# Patient Record
Sex: FEMALE | Race: WHITE | Employment: OTHER | URBAN - METROPOLITAN AREA
[De-identification: names, ages, dates, MRNs, and addresses within clinical notes are randomized per-mention and may not be internally consistent; named-entity substitution may affect disease eponyms.]

---

## 2018-06-20 PROBLEM — T50.905A ALLERGIC REACTION DUE TO CORRECT MEDICINAL SUBSTANCE PROPERLY ADMINISTERED: Status: ACTIVE | Noted: 2018-06-20

## 2018-12-04 LAB
A/G RATIO: 1.8 (ref 1.1–2.2)
ALBUMIN SERPL-MCNC: 4.4 G/DL (ref 3.4–5)
ALP BLD-CCNC: 146 U/L (ref 40–129)
ALT SERPL-CCNC: 13 U/L (ref 10–40)
ANION GAP SERPL CALCULATED.3IONS-SCNC: 15 MMOL/L (ref 3–16)
AST SERPL-CCNC: 24 U/L (ref 15–37)
BASOPHILS ABSOLUTE: 0.1 K/UL (ref 0–0.2)
BASOPHILS RELATIVE PERCENT: 0.9 %
BILIRUB SERPL-MCNC: 0.4 MG/DL (ref 0–1)
BUN BLDV-MCNC: 16 MG/DL (ref 7–20)
CALCIUM SERPL-MCNC: 9.8 MG/DL (ref 8.3–10.6)
CHLORIDE BLD-SCNC: 100 MMOL/L (ref 99–110)
CO2: 23 MMOL/L (ref 21–32)
CREAT SERPL-MCNC: 0.7 MG/DL (ref 0.6–1.2)
EOSINOPHILS ABSOLUTE: 0.2 K/UL (ref 0–0.6)
EOSINOPHILS RELATIVE PERCENT: 3.1 %
GFR AFRICAN AMERICAN: >60
GFR NON-AFRICAN AMERICAN: >60
GLOBULIN: 2.4 G/DL
GLUCOSE BLD-MCNC: 91 MG/DL (ref 70–99)
HCT VFR BLD CALC: 35.5 % (ref 36–48)
HEMOGLOBIN: 11.2 G/DL (ref 12–16)
LIPASE: 32 U/L (ref 13–60)
LYMPHOCYTES ABSOLUTE: 1.6 K/UL (ref 1–5.1)
LYMPHOCYTES RELATIVE PERCENT: 21.6 %
MCH RBC QN AUTO: 26.7 PG (ref 26–34)
MCHC RBC AUTO-ENTMCNC: 31.5 G/DL (ref 31–36)
MCV RBC AUTO: 84.8 FL (ref 80–100)
MONOCYTES ABSOLUTE: 1 K/UL (ref 0–1.3)
MONOCYTES RELATIVE PERCENT: 13.3 %
NEUTROPHILS ABSOLUTE: 4.5 K/UL (ref 1.7–7.7)
NEUTROPHILS RELATIVE PERCENT: 61.1 %
PDW BLD-RTO: 15.6 % (ref 12.4–15.4)
PLATELET # BLD: 345 K/UL (ref 135–450)
PMV BLD AUTO: 8.8 FL (ref 5–10.5)
POTASSIUM SERPL-SCNC: 5 MMOL/L (ref 3.5–5.1)
RBC # BLD: 4.19 M/UL (ref 4–5.2)
SODIUM BLD-SCNC: 138 MMOL/L (ref 136–145)
TOTAL PROTEIN: 6.8 G/DL (ref 6.4–8.2)
WBC # BLD: 7.4 K/UL (ref 4–11)

## 2018-12-21 ENCOUNTER — HOSPITAL ENCOUNTER (OUTPATIENT)
Age: 68
Discharge: HOME | End: 2018-12-21
Payer: MEDICARE

## 2018-12-21 ENCOUNTER — HOSPITAL ENCOUNTER (OUTPATIENT)
Dept: GENERAL RADIOLOGY | Age: 68
Discharge: HOME | End: 2018-12-21
Payer: MEDICARE

## 2018-12-21 DIAGNOSIS — M81.8 OTHER OSTEOPOROSIS, UNSPECIFIED PATHOLOGICAL FRACTURE PRESENCE: ICD-10-CM

## 2018-12-21 DIAGNOSIS — M54.40 ACUTE RIGHT-SIDED LOW BACK PAIN WITH SCIATICA, SCIATICA LATERALITY UNSPECIFIED: ICD-10-CM

## 2018-12-21 PROCEDURE — 72100 X-RAY EXAM L-S SPINE 2/3 VWS: CPT

## 2020-07-22 ENCOUNTER — OFFICE VISIT (OUTPATIENT)
Dept: UROGYNECOLOGY | Age: 70
End: 2020-07-22
Payer: MEDICARE

## 2020-07-22 VITALS
SYSTOLIC BLOOD PRESSURE: 128 MMHG | RESPIRATION RATE: 16 BRPM | BODY MASS INDEX: 22.45 KG/M2 | DIASTOLIC BLOOD PRESSURE: 60 MMHG | WEIGHT: 122 LBS | HEART RATE: 88 BPM | HEIGHT: 62 IN

## 2020-07-22 LAB
BILIRUBIN, POC: NEGATIVE
BLOOD URINE, POC: NORMAL
CLARITY, POC: CLEAR
COLOR, POC: YELLOW
GLUCOSE URINE, POC: NEGATIVE
KETONES, POC: NEGATIVE
LEUKOCYTE EST, POC: NORMAL
NITRITE, POC: POSITIVE
PH, POC: 7
PROTEIN, POC: NEGATIVE
SPECIFIC GRAVITY, POC: 1
UROBILINOGEN, POC: NEGATIVE

## 2020-07-22 PROCEDURE — 99214 OFFICE O/P EST MOD 30 MIN: CPT | Performed by: OBSTETRICS & GYNECOLOGY

## 2020-07-22 PROCEDURE — 51702 INSERT TEMP BLADDER CATH: CPT | Performed by: OBSTETRICS & GYNECOLOGY

## 2020-07-22 PROCEDURE — 81002 URINALYSIS NONAUTO W/O SCOPE: CPT | Performed by: OBSTETRICS & GYNECOLOGY

## 2020-07-22 RX ORDER — CIPROFLOXACIN 500 MG/1
500 TABLET, FILM COATED ORAL 2 TIMES DAILY
Qty: 14 TABLET | Refills: 0 | Status: SHIPPED | OUTPATIENT
Start: 2020-07-22 | End: 2020-08-01

## 2020-07-22 NOTE — PROGRESS NOTES
7/22/2020     Name: Ozzy Franklin  YOB: 1950    CC: Patient is a 69 y.o. presenting for evaluation of possible UTI symptoms. HPI: How long have you had this problem? years  Please rate the severity of your problem: severe  Anything make it better? Medications  She has been doing self start macrodantin and estrogen cream and cranberry    Ob/Gyn History:    OB History   No obstetric history on file. Past Medical History:   Past Medical History:   Diagnosis Date    Anemia     Arthritis     osteoporesis    Blood in stool     Chronic pancreatitis (HCC)     GERD (gastroesophageal reflux disease)     Hypertension     Migraines     Osteopetrosis     Raynaud disease     Rheumatoid arthritis(714.0)     rheumatoid arthritis    Vertigo      Past Surgical History:   Past Surgical History:   Procedure Laterality Date    BONE MARROW BIOPSY      CHOLECYSTECTOMY      COLONOSCOPY      ENDOSCOPY, COLON, DIAGNOSTIC      ENDOSCOPY, COLON, DIAGNOSTIC  07/25/2014    EGD    FRACTURE SURGERY Left 1998    femur and tibia broke in [address] PARATHYROIDECTOMY Bilateral 2006    one side taken out due to infection    TONSILLECTOMY       Allergies:    Allergies   Allergen Reactions    Sulfa Antibiotics     Demerol Hcl [Meperidine] Nausea And Vomiting    Erythromycin Diarrhea and Nausea And Vomiting     Current Medications:  Current Outpatient Medications   Medication Sig Dispense Refill    ciprofloxacin (CIPRO) 500 MG tablet Take 1 tablet by mouth 2 times daily for 10 days 14 tablet 0    RESTASIS 0.05 % ophthalmic emulsion Place 1 drop into both eyes 2 times daily  3    PREMARIN 0.625 MG/GM vaginal cream Place 0.5 g vaginally Twice a Week  2    omeprazole (PRILOSEC) 40 MG delayed release capsule Take 40 mg by mouth every morning (before breakfast)  3    naproxen sodium (ALEVE) 220 MG tablet Take 220 mg by mouth 2 times daily as needed for Pain      amylase-lipase-protease (CREON) 00234 UNITS per capsule Take 1 capsule by mouth 3 times daily (with meals).  amLODIPine (NORVASC) 10 MG tablet Take 10 mg by mouth nightly       lisinopril (PRINIVIL;ZESTRIL) 10 MG tablet Take 10 mg by mouth nightly       acetaminophen (TYLENOL) 500 MG tablet Take 1,000 mg by mouth every 6 hours as needed for Pain.  Multiple Vitamins-Minerals (MULTIVITAMIN PO) Take 1 tablet by mouth daily.  ascorbic acid (VITAMIN C) 500 MG tablet Take 500 mg by mouth daily.  Cholecalciferol (VITAMIN D3) 3000 UNITS TABS Take 1 tablet by mouth daily.  beclomethasone (QVAR) 80 MCG/ACT inhaler Inhale 1 puff into the lungs 2 times daily       No current facility-administered medications for this visit.       Social History:   Social History     Socioeconomic History    Marital status:      Spouse name: Not on file    Number of children: Not on file    Years of education: Not on file    Highest education level: Not on file   Occupational History    Not on file   Social Needs    Financial resource strain: Not on file    Food insecurity     Worry: Not on file     Inability: Not on file    Transportation needs     Medical: Not on file     Non-medical: Not on file   Tobacco Use    Smoking status: Never Smoker    Smokeless tobacco: Never Used   Substance and Sexual Activity    Alcohol use: No     Comment: rare    Drug use: No    Sexual activity: Yes     Partners: Male   Lifestyle    Physical activity     Days per week: Not on file     Minutes per session: Not on file    Stress: Not on file   Relationships    Social connections     Talks on phone: Not on file     Gets together: Not on file     Attends Christianity service: Not on file     Active member of club or organization: Not on file     Attends meetings of clubs or organizations: Not on file     Relationship status: Not on file    Intimate partner violence     Fear of current or ex partner: Not on file Emotionally abused: Not on file     Physically abused: Not on file     Forced sexual activity: Not on file   Other Topics Concern    Not on file   Social History Narrative    Not on file     Family History:   Family History   Problem Relation Age of Onset    Stroke Mother     Lung Cancer Father     Cancer Brother         Testicular    Diabetes Brother     Elevated Lipids Brother     Hypertension Brother      Review of System   Review of Systems   Genitourinary: Positive for dysuria and pelvic pain. All other systems reviewed and are negative. Vitals  Vitals:    07/22/20 1140   BP: 128/60   Site: Left Upper Arm   Position: Sitting   Cuff Size: Medium Adult   Pulse: 88   Resp: 16   Weight: 122 lb (55.3 kg)   Height: 5' 1.5\" (1.562 m)     Physical Exam  Physical Exam  Exam conducted with a chaperone present. HENT:      Head: Normocephalic and atraumatic. Pulmonary:      Effort: Pulmonary effort is normal.   Abdominal:      General: Abdomen is flat. Palpations: Abdomen is soft. Genitourinary:     Exam position: Lithotomy position. Skin:     General: Skin is warm and dry. Neurological:      Mental Status: She is alert and oriented to person, place, and time. Psychiatric:         Mood and Affect: Mood normal.         Results for POC orders placed in visit on 07/22/20   POCT Urinalysis no Micro   Result Value Ref Range    Color, UA Yellow     Clarity, UA clear     Glucose, UA POC negative     Bilirubin, UA negative     Ketones, UA negative     Spec Grav, UA 1.005     Blood, UA POC trace     pH, UA 7     Protein, UA POC negative     Urobilinogen, UA negative     Leukocytes, UA large     Nitrite, UA positive        Assessment/Plan:  Michelle Lang is a 69 y.o. female with   1. History of UTI    2. Urinary urgency    3. Urinary frequency    4. Acute cystitis with hematuria      She tried her macrobid but still has a UTI, UA was positive today.   Called in cipro 500 BID and then we will wait for the culture to see what to do for the future. Orders Placed This Encounter   Procedures    Culture, Urine     Order Specific Question:   Specify (ex-cath, midstream, cysto, etc)?      Answer:   straight cath    POCT Urinalysis no Micro    INSERT,TEMP INDWELLING BLAD CATH,SIMPLE     Orders Placed This Encounter   Medications    ciprofloxacin (CIPRO) 500 MG tablet     Sig: Take 1 tablet by mouth 2 times daily for 10 days     Dispense:  14 tablet     Refill:  0       Arian Channel

## 2020-07-22 NOTE — LETTER
[address]  Urogynecology  [address] [facility]  Phone: [phone]  Fax: [phone]    Elizabeth Marley MD        July 22, 2020     No referring provider defined for this encounter. Patient: Louise Rayo   MR Number: [id number]   YOB: 1950   Date of Visit: 7/22/2020       Dear Dr. Elan Martinez ref. provider found: Thank you for referring Louise Rayo to me for evaluation. Below are the relevant portions of my assessment and plan of care. If you have questions, please do not hesitate to call me. I look forward to following Lia Gan along with you.     Sincerely,        Elizabeth Marley MD

## 2020-07-24 ENCOUNTER — TELEPHONE (OUTPATIENT)
Dept: UROGYNECOLOGY | Age: 70
End: 2020-07-24

## 2020-07-25 LAB
ORGANISM: ABNORMAL
URINE CULTURE, ROUTINE: ABNORMAL

## 2020-07-27 ENCOUNTER — TELEPHONE (OUTPATIENT)
Dept: UROGYNECOLOGY | Age: 70
End: 2020-07-27

## 2020-07-27 NOTE — TELEPHONE ENCOUNTER
Spoke with Dr. Pincus Dakins, Notified the patient Cipro was the appropriate antibiotic for her current infection. Instructed to complete the antibiotic, if she starts to have symptoms again she can start the macrodantin if that does not help to come in for another culture.

## 2021-02-09 ENCOUNTER — HOSPITAL ENCOUNTER (OUTPATIENT)
Dept: CT IMAGING | Age: 71
Discharge: HOME | End: 2021-02-09
Payer: MEDICARE

## 2021-02-09 DIAGNOSIS — K92.1 HEMATOCHEZIA: ICD-10-CM

## 2021-02-09 LAB
GFR AFRICAN AMERICAN: >60
GFR NON-AFRICAN AMERICAN: >60
PERFORMED ON: NORMAL
POC CREATININE: 0.8 MG/DL (ref 0.6–1.2)
POC SAMPLE TYPE: NORMAL

## 2021-02-09 PROCEDURE — 74178 CT ABD&PLV WO CNTR FLWD CNTR: CPT

## 2021-02-09 PROCEDURE — 82565 ASSAY OF CREATININE: CPT

## 2021-02-09 PROCEDURE — 6360000004 HC RX CONTRAST MEDICATION: Performed by: INTERNAL MEDICINE

## 2021-02-09 RX ADMIN — IOPAMIDOL 75 ML: 755 INJECTION, SOLUTION INTRAVENOUS at 09:51

## 2021-02-09 RX ADMIN — IOHEXOL 50 ML: 240 INJECTION, SOLUTION INTRATHECAL; INTRAVASCULAR; INTRAVENOUS; ORAL at 09:51

## 2021-04-30 ENCOUNTER — TELEPHONE (OUTPATIENT)
Dept: UROGYNECOLOGY | Age: 71
End: 2021-04-30

## 2021-07-01 RX ORDER — CONJUGATED ESTROGENS 0.62 MG/G
0.5 CREAM VAGINAL
Qty: 1 TUBE | Refills: 2 | Status: SHIPPED | OUTPATIENT
Start: 2021-07-01

## 2021-07-01 NOTE — PROGRESS NOTES
Patient Requested a refill for her premarin Cream, ok to refill per Katherine Mercado NP. Refill sent to pharmacy of request, patient has an appointment in a few weeks for follow up.

## 2021-07-22 ENCOUNTER — OFFICE VISIT (OUTPATIENT)
Dept: UROGYNECOLOGY | Age: 71
End: 2021-07-22
Payer: MEDICARE

## 2021-07-22 VITALS
TEMPERATURE: 98 F | SYSTOLIC BLOOD PRESSURE: 152 MMHG | HEART RATE: 78 BPM | RESPIRATION RATE: 14 BRPM | DIASTOLIC BLOOD PRESSURE: 89 MMHG | OXYGEN SATURATION: 98 %

## 2021-07-22 DIAGNOSIS — Z87.440 HISTORY OF UTI: Primary | ICD-10-CM

## 2021-07-22 DIAGNOSIS — N95.2 VAGINAL ATROPHY: ICD-10-CM

## 2021-07-22 DIAGNOSIS — R35.0 URINARY FREQUENCY: ICD-10-CM

## 2021-07-22 DIAGNOSIS — R39.15 URINARY URGENCY: ICD-10-CM

## 2021-07-22 PROBLEM — J30.9 ALLERGIC RHINITIS: Status: ACTIVE | Noted: 2020-02-12

## 2021-07-22 PROBLEM — Z79.01 CHRONIC ANTICOAGULATION: Status: ACTIVE | Noted: 2021-01-29

## 2021-07-22 PROBLEM — J96.01 ACUTE RESPIRATORY FAILURE WITH HYPOXIA (HCC): Status: ACTIVE | Noted: 2020-12-21

## 2021-07-22 PROBLEM — M41.9 KYPHOSCOLIOSIS: Status: ACTIVE | Noted: 2018-05-14

## 2021-07-22 PROBLEM — I48.0 PAF (PAROXYSMAL ATRIAL FIBRILLATION) (HCC): Status: ACTIVE | Noted: 2021-03-08

## 2021-07-22 PROBLEM — J84.9 INTERSTITIAL LUNG DISEASE (HCC): Status: ACTIVE | Noted: 2020-12-21

## 2021-07-22 PROBLEM — I65.21 INTERNAL CAROTID ARTERY STENOSIS, RIGHT: Status: ACTIVE | Noted: 2021-02-17

## 2021-07-22 PROBLEM — D63.8 ANEMIA OF CHRONIC DISEASE: Status: ACTIVE | Noted: 2018-05-14

## 2021-07-22 PROBLEM — E21.3 HYPERPARATHYROIDISM, UNSPECIFIED (HCC): Status: ACTIVE | Noted: 2021-02-17

## 2021-07-22 PROBLEM — I73.00 RAYNAUD'S PHENOMENON: Status: ACTIVE | Noted: 2021-07-22

## 2021-07-22 PROCEDURE — 1090F PRES/ABSN URINE INCON ASSESS: CPT | Performed by: OBSTETRICS & GYNECOLOGY

## 2021-07-22 PROCEDURE — G8427 DOCREV CUR MEDS BY ELIG CLIN: HCPCS | Performed by: OBSTETRICS & GYNECOLOGY

## 2021-07-22 PROCEDURE — 3017F COLORECTAL CA SCREEN DOC REV: CPT | Performed by: OBSTETRICS & GYNECOLOGY

## 2021-07-22 PROCEDURE — 99214 OFFICE O/P EST MOD 30 MIN: CPT | Performed by: OBSTETRICS & GYNECOLOGY

## 2021-07-22 PROCEDURE — G8420 CALC BMI NORM PARAMETERS: HCPCS | Performed by: OBSTETRICS & GYNECOLOGY

## 2021-07-22 PROCEDURE — 1036F TOBACCO NON-USER: CPT | Performed by: OBSTETRICS & GYNECOLOGY

## 2021-07-22 PROCEDURE — 4040F PNEUMOC VAC/ADMIN/RCVD: CPT | Performed by: OBSTETRICS & GYNECOLOGY

## 2021-07-22 PROCEDURE — G8400 PT W/DXA NO RESULTS DOC: HCPCS | Performed by: OBSTETRICS & GYNECOLOGY

## 2021-07-22 PROCEDURE — 1123F ACP DISCUSS/DSCN MKR DOCD: CPT | Performed by: OBSTETRICS & GYNECOLOGY

## 2021-07-22 RX ORDER — NITROFURANTOIN 25; 75 MG/1; MG/1
100 CAPSULE ORAL 2 TIMES DAILY
Qty: 20 CAPSULE | Refills: 1 | Status: SHIPPED | OUTPATIENT
Start: 2021-07-22 | End: 2021-12-15

## 2021-07-22 RX ORDER — DILTIAZEM HYDROCHLORIDE 180 MG/1
180 CAPSULE, EXTENDED RELEASE ORAL DAILY
COMMUNITY

## 2021-07-22 ASSESSMENT — ENCOUNTER SYMPTOMS: SHORTNESS OF BREATH: 1

## 2021-07-22 NOTE — PROGRESS NOTES
7/22/2021       HPI:     Name: Amada Zayas  YOB: 1950    CC: Patient is a 66 y.o. presenting for evaluation of recurrent UTI.   HPI: How long have you had this problem? Several years  Please rate the severity of your problem: mild  Anything make it better? Cranberry and estrogen cream she is still UTI free. Ob/Gyn History:    OB History   No obstetric history on file. Past Medical History:   Past Medical History:   Diagnosis Date    Anemia     Arthritis     osteoporesis    Blood in stool     Chronic pancreatitis (HCC)     GERD (gastroesophageal reflux disease)     Hypertension     Migraines     Osteopetrosis     Raynaud disease     Rheumatoid arthritis(714.0)     rheumatoid arthritis    Vertigo      Past Surgical History:   Past Surgical History:   Procedure Laterality Date    BONE MARROW BIOPSY      CHOLECYSTECTOMY      COLONOSCOPY      ENDOSCOPY, COLON, DIAGNOSTIC      ENDOSCOPY, COLON, DIAGNOSTIC  07/25/2014    EGD    FRACTURE SURGERY Left 1998    femur and tibia broke in [address] PARATHYROIDECTOMY Bilateral 2006    one side taken out due to infection    TONSILLECTOMY       Allergies:    Allergies   Allergen Reactions    Sulfa Antibiotics     Demerol Hcl [Meperidine] Nausea And Vomiting    Erythromycin Diarrhea and Nausea And Vomiting     Current Medications:  Current Outpatient Medications   Medication Sig Dispense Refill    dilTIAZem (DILACOR XR) 180 MG extended release capsule Take 180 mg by mouth daily      apixaban (ELIQUIS) 5 MG TABS tablet Take 5 mg by mouth 2 times daily      nitrofurantoin, macrocrystal-monohydrate, (MACROBID) 100 MG capsule Take 1 capsule by mouth 2 times daily 20 capsule 1    PREMARIN 0.625 MG/GM vaginal cream Place 0.5 g vaginally Twice a Week 1 Tube 2    RESTASIS 0.05 % ophthalmic emulsion Place 1 drop into both eyes 2 times daily  3    omeprazole (PRILOSEC) 40 MG delayed release capsule Take 40 mg by mouth every morning (before breakfast)  3    naproxen sodium (ALEVE) 220 MG tablet Take 220 mg by mouth 2 times daily as needed for Pain      beclomethasone (QVAR) 80 MCG/ACT inhaler Inhale 1 puff into the lungs 2 times daily      amylase-lipase-protease (CREON) 06861 UNITS per capsule Take 1 capsule by mouth 3 times daily (with meals).  lisinopril (PRINIVIL;ZESTRIL) 10 MG tablet Take 10 mg by mouth nightly       Multiple Vitamins-Minerals (MULTIVITAMIN PO) Take 1 tablet by mouth daily.  ascorbic acid (VITAMIN C) 500 MG tablet Take 500 mg by mouth daily.  Cholecalciferol (VITAMIN D3) 3000 UNITS TABS Take 1 tablet by mouth daily.  acetaminophen (TYLENOL) 500 MG tablet Take 1,000 mg by mouth every 6 hours as needed for Pain. (Patient not taking: Reported on 7/22/2021)       No current facility-administered medications for this visit. Social History:   Social History     Socioeconomic History    Marital status:      Spouse name: Not on file    Number of children: Not on file    Years of education: Not on file    Highest education level: Not on file   Occupational History    Not on file   Tobacco Use    Smoking status: Never Smoker    Smokeless tobacco: Never Used   Vaping Use    Vaping Use: Never used   Substance and Sexual Activity    Alcohol use: No     Comment: rare    Drug use: No    Sexual activity: Yes     Partners: Male   Other Topics Concern    Not on file   Social History Narrative    Not on file     Social Determinants of Health     Financial Resource Strain:     Difficulty of Paying Living Expenses:    Food Insecurity:     Worried About Running Out of Food in the Last Year:     [address] in the Last Year:    Transportation Needs:     Lack of Transportation (Medical):      Lack of Transportation (Non-Medical):    Physical Activity:     Days of Exercise per Week:     Minutes of Exercise per Session:    Stress:     Feeling of Stress : nitrofurantoin, macrocrystal-monohydrate, (MACROBID) 100 MG capsule     Sig: Take 1 capsule by mouth 2 times daily     Dispense:  20 capsule     Refill:  1       Cesia Cooley MD

## 2021-11-15 ENCOUNTER — TELEPHONE (OUTPATIENT)
Dept: ENDOCRINOLOGY | Age: 71
End: 2021-11-15

## 2021-11-15 NOTE — TELEPHONE ENCOUNTER
New patient referral. Please send a Health History Form to return to my attention and let them know I will look for appt day and time after receiving it. Thanks.     Referred by Dr. Danii Lawton

## 2021-11-16 ENCOUNTER — TELEPHONE (OUTPATIENT)
Dept: ENDOCRINOLOGY | Age: 71
End: 2021-11-16

## 2021-12-06 ENCOUNTER — TELEPHONE (OUTPATIENT)
Dept: ENDOCRINOLOGY | Age: 71
End: 2021-12-06

## 2021-12-06 RX ORDER — FLUTICASONE PROPIONATE 50 MCG
1 SPRAY, SUSPENSION (ML) NASAL DAILY
COMMUNITY

## 2021-12-06 RX ORDER — METHOTREXATE 2.5 MG/1
TABLET ORAL
COMMUNITY
Start: 2021-11-15

## 2021-12-06 RX ORDER — MONTELUKAST SODIUM 10 MG/1
TABLET ORAL
COMMUNITY
Start: 2021-10-03

## 2021-12-06 RX ORDER — FOLIC ACID 1 MG/1
TABLET ORAL
COMMUNITY
Start: 2021-09-20

## 2021-12-06 NOTE — TELEPHONE ENCOUNTER
New patient, [facility] rec'd. Please schedule and let me know when appt is. Remind them to bring all vitamins and supplements. DXA needed?   No

## 2021-12-13 ENCOUNTER — HOSPITAL ENCOUNTER (EMERGENCY)
Age: 71
Discharge: HOME | End: 2021-12-13
Attending: STUDENT IN AN ORGANIZED HEALTH CARE EDUCATION/TRAINING PROGRAM
Payer: MEDICARE

## 2021-12-13 ENCOUNTER — APPOINTMENT (OUTPATIENT)
Dept: CT IMAGING | Age: 71
End: 2021-12-13
Payer: MEDICARE

## 2021-12-13 VITALS
SYSTOLIC BLOOD PRESSURE: 158 MMHG | TEMPERATURE: 98.1 F | OXYGEN SATURATION: 98 % | DIASTOLIC BLOOD PRESSURE: 81 MMHG | RESPIRATION RATE: 18 BRPM | WEIGHT: 120 LBS | HEART RATE: 90 BPM | BODY MASS INDEX: 22.66 KG/M2 | HEIGHT: 61 IN

## 2021-12-13 DIAGNOSIS — G89.29 ACUTE EXACERBATION OF CHRONIC LOW BACK PAIN: Primary | ICD-10-CM

## 2021-12-13 DIAGNOSIS — M54.50 ACUTE EXACERBATION OF CHRONIC LOW BACK PAIN: Primary | ICD-10-CM

## 2021-12-13 DIAGNOSIS — S39.012A LUMBOSACRAL STRAIN, INITIAL ENCOUNTER: ICD-10-CM

## 2021-12-13 PROCEDURE — 99283 EMERGENCY DEPT VISIT LOW MDM: CPT

## 2021-12-13 PROCEDURE — 96372 THER/PROPH/DIAG INJ SC/IM: CPT

## 2021-12-13 PROCEDURE — 72131 CT LUMBAR SPINE W/O DYE: CPT

## 2021-12-13 PROCEDURE — 6360000002 HC RX W HCPCS: Performed by: STUDENT IN AN ORGANIZED HEALTH CARE EDUCATION/TRAINING PROGRAM

## 2021-12-13 PROCEDURE — 6370000000 HC RX 637 (ALT 250 FOR IP): Performed by: STUDENT IN AN ORGANIZED HEALTH CARE EDUCATION/TRAINING PROGRAM

## 2021-12-13 RX ORDER — LIDOCAINE 4 G/G
1 PATCH TOPICAL DAILY
Status: DISCONTINUED | OUTPATIENT
Start: 2021-12-13 | End: 2021-12-13 | Stop reason: HOSPADM

## 2021-12-13 RX ORDER — METHOCARBAMOL 500 MG/1
500 TABLET, FILM COATED ORAL 4 TIMES DAILY
Qty: 40 TABLET | Refills: 0 | Status: SHIPPED | OUTPATIENT
Start: 2021-12-13 | End: 2021-12-23

## 2021-12-13 RX ORDER — PREGABALIN 75 MG/1
75 CAPSULE ORAL 2 TIMES DAILY
Qty: 60 CAPSULE | Refills: 0 | Status: SHIPPED | OUTPATIENT
Start: 2021-12-13 | End: 2022-02-09

## 2021-12-13 RX ORDER — ORPHENADRINE CITRATE 30 MG/ML
60 INJECTION INTRAMUSCULAR; INTRAVENOUS ONCE
Status: COMPLETED | OUTPATIENT
Start: 2021-12-13 | End: 2021-12-13

## 2021-12-13 RX ORDER — KETOROLAC TROMETHAMINE 30 MG/ML
15 INJECTION, SOLUTION INTRAMUSCULAR; INTRAVENOUS ONCE
Status: COMPLETED | OUTPATIENT
Start: 2021-12-13 | End: 2021-12-13

## 2021-12-13 RX ADMIN — ORPHENADRINE CITRATE 60 MG: 30 INJECTION INTRAMUSCULAR; INTRAVENOUS at 14:07

## 2021-12-13 RX ADMIN — KETOROLAC TROMETHAMINE 15 MG: 30 INJECTION, SOLUTION INTRAMUSCULAR at 14:06

## 2021-12-13 ASSESSMENT — PAIN DESCRIPTION - LOCATION: LOCATION: BACK

## 2021-12-13 ASSESSMENT — PAIN SCALES - GENERAL
PAINLEVEL_OUTOF10: 8
PAINLEVEL_OUTOF10: 10

## 2021-12-13 ASSESSMENT — PAIN DESCRIPTION - DESCRIPTORS: DESCRIPTORS: ACHING;SHARP

## 2021-12-13 ASSESSMENT — PAIN DESCRIPTION - PAIN TYPE: TYPE: CHRONIC PAIN;ACUTE PAIN

## 2021-12-14 SDOH — HEALTH STABILITY: PHYSICAL HEALTH: ON AVERAGE, HOW MANY MINUTES DO YOU ENGAGE IN EXERCISE AT THIS LEVEL?: 0 MIN

## 2021-12-14 SDOH — HEALTH STABILITY: PHYSICAL HEALTH: ON AVERAGE, HOW MANY DAYS PER WEEK DO YOU ENGAGE IN MODERATE TO STRENUOUS EXERCISE (LIKE A BRISK WALK)?: 0 DAYS

## 2021-12-14 NOTE — ED PROVIDER NOTES
[address] [facility]      Pt Name: Michlele Rodríguez  MRN: [id number]  [place] 1950  Date of evaluation: 12/13/2021  Provider: Luz Marina Lynn MD    CHIEF COMPLAINT       Chief Complaint   Patient presents with    Back Pain     patient with hx of compression fractures. patient taking flexeril and aleve without relief. patient with hx scoliosis. denies any urinating problems. is able to ambulate but with pain. denies any recent injuries. Back pain. HISTORY OF PRESENT ILLNESS   (Location/Symptom, Timing/Onset,Context/Setting, Quality, Duration, Modifying Factors, Severity)  Note limiting factors. Michelle Rodríguez is a 66 y.o. female who presents to the emergency department with back pain x 3-4 days after helping lift some boxes in the garage. Has significant h/o scoliosis and compression fractures. Denies trauma, falls, but states she slipped and twisted her back feeling a pop and is concerned for a new compression fracture. Pain localized to the mid lumbar back radiating down the right leg. Pain so severe she is having difficulty walking. Denies urinary retention, focal weakness, sensory loss, fevers. Symptoms not otherwise alleviated or exacerbated by other factors. NursingNotes were reviewed. REVIEW OF SYSTEMS    (2-9 systems for level 4, 10 or more for level 5)       Constitutional: No fever or chills. Eye: No visual disturbances. No eye pain. Ear/Nose/Mouth/Throat: No nasal congestion. No sore throat. Respiratory: No cough, No shortness of breath, No sputum production. Cardiovascular: No chest pain. No palpitations. Gastrointestinal: No abdominal pain. No nausea or vomiting  Genitourinary: No dysuria. No hematuria. Hematology/Lymphatics: No bleeding or bruising tendency. Immunologic: No malaise. No swollen glands. Musculoskeletal: + back pain. No joint pain. Integumentary: No rash. No abrasions.   Neurologic: No headache. No focal numbness or weakness.       PAST MEDICAL HISTORY     Past Medical History:   Diagnosis Date    Anemia     Arthritis     osteoporesis    Blood in stool     Chronic pancreatitis ([facility] [address]     GERD (gastroesophageal reflux disease)     Hypertension     Migraines     Osteopetrosis     Postmenopausal     Raynaud disease     Rheumatoid arthritis(714.0)     rheumatoid arthritis    Rib fracture 1975    2 ribs fracture - coughing    Vertigo          SURGICALHISTORY       Past Surgical History:   Procedure Laterality Date    BONE MARROW BIOPSY      CHOLECYSTECTOMY      COLONOSCOPY      ENDOSCOPY, COLON, DIAGNOSTIC      ENDOSCOPY, COLON, DIAGNOSTIC  07/25/2014    EGD    FRACTURE SURGERY Left 1998    femur and tibia broke in [address] PARATHYROIDECTOMY Bilateral 2006    one side taken out due to infection    TONSILLECTOMY           CURRENT MEDICATIONS       Discharge Medication List as of 12/13/2021  3:46 PM      CONTINUE these medications which have NOT CHANGED    Details   montelukast (SINGULAIR) 10 MG tablet Historical Med      methotrexate (RHEUMATREX) 2.5 MG chemo tablet Historical Med      folic acid (FOLVITE) 1 MG tablet Historical Med      fluticasone (FLONASE) 50 MCG/ACT nasal spray 1 spray by Nasal route dailyHistorical Med      dilTIAZem (DILACOR XR) 180 MG extended release capsule Take 180 mg by mouth dailyHistorical Med      apixaban (ELIQUIS) 5 MG TABS tablet Take 5 mg by mouth 2 times dailyHistorical Med      nitrofurantoin, macrocrystal-monohydrate, (MACROBID) 100 MG capsule Take 1 capsule by mouth 2 times daily, Disp-20 capsule, R-1Normal      PREMARIN 0.625 MG/GM vaginal cream Place 0.5 g vaginally Twice a Week, Vaginal, TWICE WEEKLY Starting Thu 7/1/2021, Disp-1 Tube, R-2, ANUSHKA, Normal      RESTASIS 0.05 % ophthalmic emulsion Place 1 drop into both eyes 2 times daily, R-3, DAWHistorical Med      omeprazole (PRILOSEC) 40 MG delayed release capsule Take 40 mg by mouth every morning (before breakfast), R-3Historical Med      naproxen sodium (ALEVE) 220 MG tablet Take 220 mg by mouth 2 times daily as needed for PainHistorical Med      beclomethasone (QVAR) 80 MCG/ACT inhaler Inhale 1 puff into the lungs 2 times daily      amylase-lipase-protease (CREON) 75647 UNITS per capsule Take 1 capsule by mouth 3 times daily (with meals). , Oral, 3 TIMES DAILY WITH MEALS, Until Discontinued, Historical Med      lisinopril (PRINIVIL;ZESTRIL) 10 MG tablet Take 10 mg by mouth nightly Historical Med      acetaminophen (TYLENOL) 500 MG tablet Take 1,000 mg by mouth every 6 hours as needed for Pain. Multiple Vitamins-Minerals (MULTIVITAMIN PO) Take 1 tablet by mouth daily. ascorbic acid (VITAMIN C) 500 MG tablet Take 500 mg by mouth daily. Cholecalciferol (VITAMIN D3) 3000 UNITS TABS Take 1 tablet by mouth daily.              ALLERGIES     Sulfa antibiotics, Demerol hcl [meperidine], and Erythromycin    FAMILY HISTORY       Family History   Problem Relation Age of Onset    Stroke Mother     Osteoporosis Mother     Lung Cancer Father     Cancer Brother         Testicular    Diabetes Brother     Elevated Lipids Brother     Hypertension Brother           SOCIAL HISTORY       Social History     Socioeconomic History    Marital status:      Spouse name: None    Number of children: None    Years of education: None    Highest education level: None   Occupational History    None   Tobacco Use    Smoking status: Never Smoker    Smokeless tobacco: Never Used   Vaping Use    Vaping Use: Never used   Substance and Sexual Activity    Alcohol use: No     Comment: rare    Drug use: No    Sexual activity: Yes     Partners: Male   Other Topics Concern    None   Social History Narrative    None     Social Determinants of Health     Financial Resource Strain:     Difficulty of Paying Living Expenses: Not on file   Food Insecurity:     Worried About Running Out of Food in the Last Year: Not on file    Ran Out of Food in the Last Year: Not on file   Transportation Needs:     Lack of Transportation (Medical): Not on file    Lack of Transportation (Non-Medical): Not on file   Physical Activity:     Days of Exercise per Week: Not on file    Minutes of Exercise per Session: Not on file   Stress:     Feeling of Stress : Not on file   Social Connections:     Frequency of Communication with Friends and Family: Not on file    Frequency of Social Gatherings with Friends and Family: Not on file    Attends Rastafari Services: Not on file    Active Member of [employer] Group or Organizations: Not on file    Attends Club or Organization Meetings: Not on file    Marital Status: Not on file   Intimate Partner Violence:     Fear of Current or Ex-Partner: Not on file    Emotionally Abused: Not on file    Physically Abused: Not on file    Sexually Abused: Not on file   Housing Stability:     Unable to Pay for Housing in the Last Year: Not on file    Number of [place] in the Last Year: Not on file    Unstable Housing in the Last Year: Not on file       SCREENINGS             PHYSICAL EXAM    (up to 7 for level 4, 8 or more for level 5)     ED Triage Vitals [12/13/21 1129]   BP Temp Temp Source Pulse Resp SpO2 Height Weight   (!) 164/95 98.1 °F (36.7 °C) Oral 93 18 98 % 5' 1\" (1.549 m) 120 lb (54.4 kg)       General: Alert and oriented appropriately for age, No acute distress. Eye: Normal conjunctiva. Pupils equal and reactive. HENT: Oral mucosa is moist.  Respiratory: Respirations even and non-labored. Cardiovascular: Normal rate, Regular rhythm. Gastrointestinal: Soft, Non-tender, Non-distended. BACK: There is lumbar midline tenderness to palpation, no step-offs. Paraspinal tenderness to palpation is  present in the bilateral lumbar region. region. No overlying rashes. LE strength is 5/5. LE light touch is intact. LE DTR's are 2+.  Straight leg test is  present on the right. Gait is steady but antalgic. Integumentary: Warm, Dry. Neurologic: Alert and appropriate for age. No saddle anesthesia. Volitional rectal tone. No focal deficits. Psychiatric: Cooperative. DIAGNOSTIC RESULTS         RADIOLOGY:   Non-plain filmimages such as CT, Ultrasound and MRI are read by the radiologist. Plain radiographic images are visualized and preliminarily interpreted by the emergency physician with the below findings:      Interpretation per the Radiologist below, if available at the time ofthis note:    CT Lumbar Spine WO Contrast   Final Result   No acute osseous abnormalities are identified. RECOMMENDATIONS:   Unavailable               EMERGENCY DEPARTMENT COURSE and DIFFERENTIAL DIAGNOSIS/MDM:   Vitals:    Vitals:    21 1129 21 1549   BP: (!) 164/95 (!) 158/81   Pulse: 93 90   Resp: 18 18   Temp: 98.1 °F (36.7 °C) 98.1 °F (36.7 °C)   TempSrc: Oral Oral   SpO2: 98% 98%   Weight: 120 lb (54.4 kg)    Height: 5' 1\" (1.549 m)          Medical decision makin yo F with PMHx of compression fx, DDD, scoliosis p/w back pain exacerbated by lifting injury, she is c/f fx given her progressive pain to the point of difficulty walking 2/2 pain. Neuro intact throughout but with midline vertebral point ttp, pt not fully sure no traumatic component but doesn't think so. Examining with CT for new lumbar spine fx. MMPC. Pt expressly does not want opiates. Pain mildly improved after meds, no fx on CT L-spine, overall stable examination. Repeat exam HDS, neuro intact. Likely muscle spasm leading to radiculopathy. Pt is stable for and amenable to d/c home. Referred to PT and Rx MMPC.     Medications   ketorolac (TORADOL) injection 15 mg (15 mg IntraMUSCular Given 21 1406)   orphenadrine (NORFLEX) injection 60 mg (60 mg IntraMUSCular Given 21 1407)         I estimate there is LOW risk for (including but not limited to) RAPIDLY EXPANDING OR RUPTURED AAA, AORTIC DISSECTION, CAUDA EQUINA SYNDROME, or EPIDURAL MASS LESION thus I consider the discharge disposition reasonable. Krystle Elliott (or their surrogate) and I have discussed the diagnosis and risks, and we agree with discharging home with close follow-up. We also discussed returning to the Emergency Department immediately if new or worsening symptoms occur. We have discussed the symptoms which are most concerning that necessitate immediate return. FINAL IMPRESSION      1. Acute exacerbation of chronic low back pain    2. Lumbosacral strain, initial encounter          DISPOSITION/PLAN   DISPOSITION Decision To Discharge 12/13/2021 03:33:34 PM      PATIENT REFERRED TO:  Dhruv Corona MD  [address] [facility] [address] [facility]    In 3 days      [facility]  [address] [facility]  [phone]    If symptoms worsen    [facility], [address]  In 2 days      Mic Bauman MD  [address] [id number]    In 2 days  if unable to get into [address]:  Discharge Medication List as of 12/13/2021  3:46 PM      START taking these medications    Details   methocarbamol (ROBAXIN) 500 MG tablet Take 1 tablet by mouth 4 times daily for 10 days, Disp-40 tablet, R-0Print      pregabalin (LYRICA) 75 MG capsule Take 1 capsule by mouth 2 times daily for 30 days. , Disp-60 capsule, R-0Print                (Please note that portions of this note were completed with a voice recognition program.Efforts were made to edit the dictations but occasionally words are mis-transcribed.)    Raj Palomo MD (electronically signed)  Attending Emergency Physician          Raj Palomo MD  12/15/21 2791

## 2021-12-15 ENCOUNTER — TELEPHONE (OUTPATIENT)
Dept: ORTHOPEDIC SURGERY | Age: 71
End: 2021-12-15

## 2021-12-15 ENCOUNTER — OFFICE VISIT (OUTPATIENT)
Dept: ORTHOPEDIC SURGERY | Age: 71
End: 2021-12-15
Payer: MEDICARE

## 2021-12-15 VITALS — RESPIRATION RATE: 16 BRPM | BODY MASS INDEX: 22.66 KG/M2 | HEIGHT: 61 IN | WEIGHT: 120 LBS

## 2021-12-15 DIAGNOSIS — S32.010A COMPRESSION FRACTURE OF L1 VERTEBRA, INITIAL ENCOUNTER (HCC): ICD-10-CM

## 2021-12-15 DIAGNOSIS — M54.50 ACUTE LOW BACK PAIN, UNSPECIFIED BACK PAIN LATERALITY, UNSPECIFIED WHETHER SCIATICA PRESENT: Primary | ICD-10-CM

## 2021-12-15 PROCEDURE — 1090F PRES/ABSN URINE INCON ASSESS: CPT | Performed by: PHYSICIAN ASSISTANT

## 2021-12-15 PROCEDURE — G8400 PT W/DXA NO RESULTS DOC: HCPCS | Performed by: PHYSICIAN ASSISTANT

## 2021-12-15 PROCEDURE — G8484 FLU IMMUNIZE NO ADMIN: HCPCS | Performed by: PHYSICIAN ASSISTANT

## 2021-12-15 PROCEDURE — 3017F COLORECTAL CA SCREEN DOC REV: CPT | Performed by: PHYSICIAN ASSISTANT

## 2021-12-15 PROCEDURE — G8427 DOCREV CUR MEDS BY ELIG CLIN: HCPCS | Performed by: PHYSICIAN ASSISTANT

## 2021-12-15 PROCEDURE — 99204 OFFICE O/P NEW MOD 45 MIN: CPT | Performed by: PHYSICIAN ASSISTANT

## 2021-12-15 PROCEDURE — 1036F TOBACCO NON-USER: CPT | Performed by: PHYSICIAN ASSISTANT

## 2021-12-15 PROCEDURE — G8420 CALC BMI NORM PARAMETERS: HCPCS | Performed by: PHYSICIAN ASSISTANT

## 2021-12-15 PROCEDURE — 1123F ACP DISCUSS/DSCN MKR DOCD: CPT | Performed by: PHYSICIAN ASSISTANT

## 2021-12-15 PROCEDURE — 4040F PNEUMOC VAC/ADMIN/RCVD: CPT | Performed by: PHYSICIAN ASSISTANT
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
## 2021-12-16 ENCOUNTER — OFFICE VISIT (OUTPATIENT)
Dept: ENDOCRINOLOGY | Age: 71
End: 2021-12-16
Payer: MEDICARE

## 2021-12-16 ENCOUNTER — HOSPITAL ENCOUNTER (OUTPATIENT)
Dept: MRI IMAGING | Age: 71
Discharge: HOME | End: 2021-12-16
Payer: MEDICARE

## 2021-12-16 VITALS
HEIGHT: 60 IN | WEIGHT: 122.6 LBS | SYSTOLIC BLOOD PRESSURE: 139 MMHG | DIASTOLIC BLOOD PRESSURE: 74 MMHG | BODY MASS INDEX: 24.07 KG/M2

## 2021-12-16 DIAGNOSIS — M81.0 OSTEOPOROSIS, POSTMENOPAUSAL: Primary | ICD-10-CM

## 2021-12-16 DIAGNOSIS — M80.08XD VERTEBRAL FRACTURE, OSTEOPOROTIC, WITH ROUTINE HEALING, SUBSEQUENT ENCOUNTER: ICD-10-CM

## 2021-12-16 DIAGNOSIS — E83.52 HYPERCALCEMIA: ICD-10-CM

## 2021-12-16 DIAGNOSIS — S32.010A COMPRESSION FRACTURE OF L1 VERTEBRA, INITIAL ENCOUNTER (HCC): ICD-10-CM

## 2021-12-16 DIAGNOSIS — E21.0 HYPERPARATHYROIDISM, PRIMARY (HCC): ICD-10-CM

## 2021-12-16 PROBLEM — E21.3 HYPERPARATHYROIDISM, UNSPECIFIED (HCC): Status: RESOLVED | Noted: 2021-02-17 | Resolved: 2021-12-16

## 2021-12-16 PROBLEM — I73.00 RAYNAUD'S PHENOMENON: Status: RESOLVED | Noted: 2021-07-22 | Resolved: 2021-12-16

## 2021-12-16 PROBLEM — T50.905A ALLERGIC REACTION DUE TO CORRECT MEDICINAL SUBSTANCE PROPERLY ADMINISTERED: Status: RESOLVED | Noted: 2018-06-20 | Resolved: 2021-12-16

## 2021-12-16 PROBLEM — J96.01 ACUTE RESPIRATORY FAILURE WITH HYPOXIA (HCC): Status: RESOLVED | Noted: 2020-12-21 | Resolved: 2021-12-16

## 2021-12-16 PROCEDURE — G8420 CALC BMI NORM PARAMETERS: HCPCS | Performed by: INTERNAL MEDICINE

## 2021-12-16 PROCEDURE — G8484 FLU IMMUNIZE NO ADMIN: HCPCS | Performed by: INTERNAL MEDICINE

## 2021-12-16 PROCEDURE — 1123F ACP DISCUSS/DSCN MKR DOCD: CPT | Performed by: INTERNAL MEDICINE

## 2021-12-16 PROCEDURE — 1090F PRES/ABSN URINE INCON ASSESS: CPT | Performed by: INTERNAL MEDICINE

## 2021-12-16 PROCEDURE — 3017F COLORECTAL CA SCREEN DOC REV: CPT | Performed by: INTERNAL MEDICINE

## 2021-12-16 PROCEDURE — 72148 MRI LUMBAR SPINE W/O DYE: CPT

## 2021-12-16 PROCEDURE — 4040F PNEUMOC VAC/ADMIN/RCVD: CPT | Performed by: INTERNAL MEDICINE

## 2021-12-16 PROCEDURE — 99205 OFFICE O/P NEW HI 60 MIN: CPT | Performed by: INTERNAL MEDICINE

## 2021-12-16 PROCEDURE — 1036F TOBACCO NON-USER: CPT | Performed by: INTERNAL MEDICINE

## 2021-12-16 PROCEDURE — G8400 PT W/DXA NO RESULTS DOC: HCPCS | Performed by: INTERNAL MEDICINE

## 2021-12-16 PROCEDURE — G8427 DOCREV CUR MEDS BY ELIG CLIN: HCPCS | Performed by: INTERNAL MEDICINE

## 2021-12-16 PROCEDURE — G2212 PROLONG OUTPT/OFFICE VIS: HCPCS | Performed by: INTERNAL MEDICINE
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
## 2021-12-16 NOTE — PROGRESS NOTES
[facility] ([facility]) Osteoporosis and [address] [facility]  [facility] [address], [address] [facility] [address]  Phone [phone]  Fax [phone]    NAME:  Jayden Dutton  :  1950  CONSULT DATE:    2008 at   MOST RECENT VISIT:  2011 at [address]:  2021    Labs @ [facility] 2021    CONSULTATION REQUESTED BY: Иван Stewart MD  OTHERS WHO NEED REPORTS: Chacorta Faith [address] PA    PROBLEMS. Osteoporosis by DXA 2002, lowest T-scores-3.6 in the spine (L3-L4)     Family history of osteoporosis, mother with kyphoscoliosis    Compression fx T5, T8, T11 and L2 by VFA 2008, bone scan negative 2008 (fx were old, [address])    Kyphosis    2021 worsening fx L1    5 height loss  Surgical menopause age 38  Hyperparathyroidism, left superior adenoma 2008 (Dr. Valdemar Bueno)  Hypercalcemia, normal for [facility] Ca 8.8-10.4 albumin 3.2-4.6.    2019 10.7, albumin 4.7, Lisa Ca 10.1.  2021 Ca 10.9 albumin 4.7 Lisa Ca 10.3. Steroid use 2021-2021 due to lung virus  Hypertension  GERD  Rheumatoid arthritis  Anemia diagnosed 10/2007, iron deficiency but not bleeding, IV iron 2008, again 2010  Pancreatitis; stent ~, removed, uses Creon  Interstitial lung disease  Back pain after bending 2021, gradually resolved, worse again 2021    MRI 2021 shows chronic L2 fx but no acute changes    CURRENT MANAGEMENT FOR BONE HEALTH/OSTEOPOROSIS. Calcium, 300 mg from low calcium foods,  300 mg milk, 300 mg cheese, 200 mg cottage cheese   diet MVI Ca+D other total    Calcium 1100 380    mg/d   Vitamin D    3000  IU/d     25-OH D 51 ng/mL 2021 (desirable is 30-60 ng/mL)  Exercise, nothing regular  Pharmacologic therapy: Prolia 60 mg SQ twice yearly started 2017    PREVIOUS BONE-ACTIVE MEDICATIONS.    Fosamax  Reclast [phone]  Forteo [id number]    OTHER CURRENT MEDICATIONS (SELECTED): Eliquis, Creon, Cardizem, lisinopril, Singulair, omeprazole  OTC MEDICATIONS (SELECTED): vitamin C, cranberry    CHIEF COMPLAINT. Is Prolia working?     HISTORY OF PRESENT ILLNESS: See problem list for chronic/inactive conditions. Ms. Simón Castañeda is a 78-year-old woman who was found to have osteoporosis by DXA in 2002. Most recent treatment was Forteo for 2 years ~[id number]) followed by Prolia 60 mg SQ twice yearly started 2017. She has not missed or been late for Prolia. Last dose was a few weeks ago. FOR FULL DETAILS OF FAMILY HISTORY, PAST MEDICAL AND SURGICAL HISTORY, SOCIAL HISTORY, AND REVIEW OF SYSTEMS, SEE PATIENT QUESTIONNAIRE OF TODAYS DATE. FAMILY HISTORY. Relevant hx in problem list and/or HPI. Otherwise not contributory. PAST MEDICAL HISTORY. Noted in health history form. PAST SURGICAL HISTORY. Noted in health history form. SOCIAL HISTORY. Nonsmoker. No excessive intake of alcohol, caffeine or sodas. Lives with her . REVIEW OF SYSTEMS. Maximum adult height 67. 5. 5 height loss. Usual weight 122#. No recent significant change in weight. PHYSICAL EXAMINATION. GENERAL. Well-nourished, well-developed, normally proportioned adult. MENTAL STATUS. Pleasant mood. Oriented to time, place, and person. ORAL. Teeth appear to be in good condition. SKIN. Normal texture and turgor. LUNGS. Clear to auscultation. Breath sounds normal.  HEART. Heart sounds normal, no murmur or gallop. MUSCULOSKELETAL. The examination included inspection/palpation (any misalignment, asymmetry, crepitation, defects, tenderness, masses, effusions is noted), assessment of range of motion (any presence of pain, crepitation, contracture is noted), assessment of stability (any dislocation, subluxation, laxity is noted), assessment of muscle strength and tone (any atrophy or abnormal movements is noted). Pelvis appears normal.  Kyphosis. No spine tenderness to palpation or percussion. No finger spaces between ribs and pelvis.   Gait slow and unsteady without assistance. NEUROLOGICAL. Not to rise from chair without using arms. No apparent motor or sensory deficit. Coordination appears normal     BONE DENSITY. Most recent done at [facility] using [employer]. 2028 and 2021 studies done at [facility] using [address] [place] [address] [employer] equipment. I deleted L1 and L2 due to T-score discrepancy in L1 and compression fracture of L2. T-scores   Initial study: 01/28/2008* L3-L4 -3.4 left fem. neck -2.5   Current study: 11/01/2021 L3-L4 -3.6 left fem. neck -3.5     The table below shows bone mineral density (grams/cm2), the appropriate measure for comparing serial scans. A significant increase or decrease is based on precision studies done at our center according to the ISCD protocol with a least significant change of 0.030 g/cm2. PA spine Proximal Femur (left)   Date L3-L4 Fem. neck Trochanter Total hip   01/28/2008* 0.691 0.554 0.387 0.638   02/23/2009 0.686 0.504 0.483 0.663   02/24/2010 0.672 0.493 0.470 0.651   03/28/2011 0.671 0.487 0.491 0.633   10/21/2013 0.704 0.476 0.434 0.639   10/22/2014 0.701 0.475 --- 0.651   10/23/2015 0.699 0.494 --- 0.655   10/24/2016 0.675 0.496 --- 0.647   10/26/2017 0.736 0.503 --- 0.628   05/09/2018* 0.882 0.533 --- 0.678   10/30/2019 0.682 0.509 --- 0.633   05/12/2020* 0.841 0.510 --- 0.678   11/01/2021 0.702 0.461 --- 0.616   *Done with [employer] equipment. BMD converted to Hologic units. Labs: 11/2021 Cr 0.8 Ca 10.9 alb 4.7 (3.2-4.6) Lisa Ca 10.3. Imaging: DXA printouts reviewed. 12/2021 LS MR old fx. Medical records or reports requested: Will request labs from [facility] (Dr Nicole Decker). ASSESSMENT. Osteoporosis, bone density lower than desirable. Hard to know how its working from BMD done at different centers and with different equipment.      Hypercalcemia may be a fluke or due to high albumin (she is a hard stick and has to pump her fist after the tourniquet is applied) but she did have hyperparathyroidism (surgery in 2008) so this needs further investigation. DIAGNOSTIC PLANS. Blood tests: RFP and PTH today, repeat RFP x 2 (in 1 week and 2 weeks). I will be in touch with the patient to review lab results. THERAPEUTIC PLANS. Calcium, current calcium intake is fine. Vitamin D, current vitamin D intake is fine. Exercise, Recommended weight-bearing exercise (walking or equivalent) 30-40 minutes per session, 3 or 4 sessions a week and resistance exercise. Advised to take care to avoid injury (high impact, falling, etc). We discussed avoiding activities that place compressive forces on the spine; specifically pushing, pulling, bending, lifting and twisting to help prevent vertebral fractures. Pharmacologic therapy, I recommended continuing with Prolia for now. Return appointment with DXA in 1 year. Would consider adding Forteo to Prolia if BMD is not clearly improving. Total time on the date the encounter was  minutes. Cecile Pringle MD, Director, [facility]) Osteoporosis and Bone Health Services    CC: Loki BANSAL

## 2021-12-17 DIAGNOSIS — M48.061 LUMBAR FORAMINAL STENOSIS: Primary | ICD-10-CM

## 2021-12-17 LAB
ALBUMIN SERPL-MCNC: 4 G/DL (ref 3.5–5)
ANION GAP SERPL CALCULATED.3IONS-SCNC: 9 MMOL/L (ref 5–13)
BUN / CREAT RATIO: 21
BUN BLDV-MCNC: 15 MG/DL (ref 7–25)
CALCIUM SERPL-MCNC: 9.7 MG/DL (ref 8.8–10.9)
CHLORIDE BLD-SCNC: 105 MMOL/L (ref 98–110)
CO2: 24 MMOL/L (ref 22–29)
CREAT SERPL-MCNC: 0.7 MG/DL (ref 0.5–1.2)
GFR AFRICAN AMERICAN: 100 SEE NOTE
GFR NON-AFRICAN AMERICAN: 82 SEE NOTE
GLUCOSE BLD-MCNC: 102 MG/DL (ref 71–99)
PHOSPHORUS: 2.8 MG/DL (ref 2.1–4.3)
POTASSIUM SERPL-SCNC: 4.2 MMOL/L (ref 3.5–5.1)
SODIUM BLD-SCNC: 138 MMOL/L (ref 135–146)

## 2021-12-23 LAB
ALBUMIN SERPL-MCNC: 4.3 G/DL (ref 3.5–5)
ANION GAP SERPL CALCULATED.3IONS-SCNC: 10 MMOL/L (ref 5–13)
BUN / CREAT RATIO: 22
BUN BLDV-MCNC: 16 MG/DL (ref 7–25)
CALCIUM SERPL-MCNC: 10.5 MG/DL (ref 8.8–10.9)
CHLORIDE BLD-SCNC: 104 MMOL/L (ref 98–110)
CO2: 22 MMOL/L (ref 22–29)
CREAT SERPL-MCNC: 0.72 MG/DL (ref 0.5–1.2)
GFR AFRICAN AMERICAN: 97 SEE NOTE
GFR NON-AFRICAN AMERICAN: 80 SEE NOTE
GLUCOSE BLD-MCNC: 77 MG/DL (ref 71–99)
PHOSPHORUS: 3 MG/DL (ref 2.1–4.3)
POTASSIUM SERPL-SCNC: 4.7 MMOL/L (ref 3.5–5.1)
SODIUM BLD-SCNC: 136 MMOL/L (ref 135–146)

## 2021-12-30 LAB
ALBUMIN SERPL-MCNC: 4.5 G/DL (ref 3.5–5)
ANION GAP SERPL CALCULATED.3IONS-SCNC: 8 MMOL/L (ref 5–13)
BUN / CREAT RATIO: 19
BUN BLDV-MCNC: 15 MG/DL (ref 7–25)
CALCIUM SERPL-MCNC: 10.8 MG/DL (ref 8.8–10.9)
CHLORIDE BLD-SCNC: 104 MMOL/L (ref 98–110)
CO2: 26 MMOL/L (ref 22–29)
CREAT SERPL-MCNC: 0.78 MG/DL (ref 0.5–1.2)
GFR AFRICAN AMERICAN: 88 SEE NOTE
GFR NON-AFRICAN AMERICAN: 73 SEE NOTE
GLUCOSE BLD-MCNC: 100 MG/DL (ref 71–99)
PHOSPHORUS: 3.3 MG/DL (ref 2.1–4.3)
POTASSIUM SERPL-SCNC: 4.5 MMOL/L (ref 3.5–5.1)
SODIUM BLD-SCNC: 138 MMOL/L (ref 135–146)

## 2022-01-26 ENCOUNTER — OFFICE VISIT (OUTPATIENT)
Dept: UROGYNECOLOGY | Age: 72
End: 2022-01-26
Payer: MEDICARE

## 2022-01-26 VITALS
RESPIRATION RATE: 14 BRPM | OXYGEN SATURATION: 98 % | DIASTOLIC BLOOD PRESSURE: 89 MMHG | HEART RATE: 78 BPM | TEMPERATURE: 98 F | SYSTOLIC BLOOD PRESSURE: 152 MMHG

## 2022-01-26 DIAGNOSIS — J84.9 INTERSTITIAL LUNG DISEASE (HCC): ICD-10-CM

## 2022-01-26 DIAGNOSIS — N95.2 VAGINAL ATROPHY: ICD-10-CM

## 2022-01-26 DIAGNOSIS — Z87.440 HISTORY OF UTI: Primary | ICD-10-CM

## 2022-01-26 PROCEDURE — 1036F TOBACCO NON-USER: CPT | Performed by: NURSE PRACTITIONER

## 2022-01-26 PROCEDURE — 1123F ACP DISCUSS/DSCN MKR DOCD: CPT | Performed by: NURSE PRACTITIONER

## 2022-01-26 PROCEDURE — G8484 FLU IMMUNIZE NO ADMIN: HCPCS | Performed by: NURSE PRACTITIONER

## 2022-01-26 PROCEDURE — 3017F COLORECTAL CA SCREEN DOC REV: CPT | Performed by: NURSE PRACTITIONER

## 2022-01-26 PROCEDURE — 1090F PRES/ABSN URINE INCON ASSESS: CPT | Performed by: NURSE PRACTITIONER

## 2022-01-26 PROCEDURE — 4040F PNEUMOC VAC/ADMIN/RCVD: CPT | Performed by: NURSE PRACTITIONER

## 2022-01-26 PROCEDURE — G8427 DOCREV CUR MEDS BY ELIG CLIN: HCPCS | Performed by: NURSE PRACTITIONER

## 2022-01-26 PROCEDURE — G8420 CALC BMI NORM PARAMETERS: HCPCS | Performed by: NURSE PRACTITIONER

## 2022-01-26 PROCEDURE — 99213 OFFICE O/P EST LOW 20 MIN: CPT | Performed by: NURSE PRACTITIONER

## 2022-01-26 PROCEDURE — G8400 PT W/DXA NO RESULTS DOC: HCPCS | Performed by: NURSE PRACTITIONER

## 2022-01-26 RX ORDER — AZELASTINE 1 MG/ML
1 SPRAY, METERED NASAL 2 TIMES DAILY
COMMUNITY

## 2022-01-26 ASSESSMENT — ENCOUNTER SYMPTOMS
BACK PAIN: 1
SHORTNESS OF BREATH: 1

## 2022-01-26 NOTE — PROGRESS NOTES
1/26/2022       HPI:     Name: Cali Rueda  YOB: 1950    CC: Patient is a 66 y.o. presenting for evaluation of recurrent UTI.   HPI: How long have you had this problem? Please rate the severity of your problem: mild  Anything make it better? Cranberry, Estrogen, and self start macrobid. Has not even needed to take the macrobid. She is doing very well. She has not had to use her self start macrobid in over one year. She is asymptomatic today. PMH:  New back pain issues. Now seeing Pain Specialist  PSH:  No changes      Ob/Gyn History:    OB History   No obstetric history on file. Past Medical History:   Past Medical History:   Diagnosis Date    Anemia     Arthritis     osteoporesis    Blood in stool     Chronic pancreatitis (HCC)     GERD (gastroesophageal reflux disease)     Hypertension     Migraines     Osteopetrosis     Postmenopausal     Raynaud disease     Rheumatoid arthritis(714.0)     rheumatoid arthritis    Rib fracture 1975    2 ribs fracture - coughing    Vertigo      Past Surgical History:   Past Surgical History:   Procedure Laterality Date    BONE MARROW BIOPSY      CHOLECYSTECTOMY      COLONOSCOPY      ENDOSCOPY, COLON, DIAGNOSTIC      ENDOSCOPY, COLON, DIAGNOSTIC  07/25/2014    EGD    FRACTURE SURGERY Left 1998    femur and tibia broke in [address] PARATHYROIDECTOMY Bilateral 2006    one side taken out due to infection    TONSILLECTOMY       Allergies:    Allergies   Allergen Reactions    Sulfa Antibiotics     Demerol Hcl [Meperidine] Nausea And Vomiting    Erythromycin Diarrhea and Nausea And Vomiting     Current Medications:  Current Outpatient Medications   Medication Sig Dispense Refill    azelastine (ASTELIN) 0.1 % nasal spray 1 spray by Nasal route 2 times daily Use in each nostril as directed      montelukast (SINGULAIR) 10 MG tablet       methotrexate (RHEUMATREX) 2.5 MG chemo tablet       folic acid (FOLVITE) 1 MG tablet       dilTIAZem (DILACOR XR) 180 MG extended release capsule Take 180 mg by mouth daily      apixaban (ELIQUIS) 5 MG TABS tablet Take 5 mg by mouth 2 times daily      PREMARIN 0.625 MG/GM vaginal cream Place 0.5 g vaginally Twice a Week 1 Tube 2    RESTASIS 0.05 % ophthalmic emulsion Place 1 drop into both eyes 2 times daily  3    omeprazole (PRILOSEC) 40 MG delayed release capsule Take 40 mg by mouth every morning (before breakfast)  3    amylase-lipase-protease (CREON) 32499 UNITS per capsule Take 1 capsule by mouth 3 times daily (with meals).  lisinopril (PRINIVIL;ZESTRIL) 10 MG tablet Take 20 mg by mouth nightly       acetaminophen (TYLENOL) 500 MG tablet Take 1,000 mg by mouth every 6 hours as needed for Pain       Multiple Vitamins-Minerals (MULTIVITAMIN PO) Take 1 tablet by mouth daily.  ascorbic acid (VITAMIN C) 500 MG tablet Take 500 mg by mouth daily.  Cholecalciferol (VITAMIN D3) 3000 UNITS TABS Take 1 tablet by mouth daily.  pregabalin (LYRICA) 75 MG capsule Take 1 capsule by mouth 2 times daily for 30 days. 60 capsule 0    fluticasone (FLONASE) 50 MCG/ACT nasal spray 1 spray by Nasal route daily (Patient not taking: Reported on 1/26/2022)      naproxen sodium (ALEVE) 220 MG tablet Take 220 mg by mouth 2 times daily as needed for Pain (Patient not taking: Reported on 1/26/2022)       No current facility-administered medications for this visit.      Social History:   Social History     Socioeconomic History    Marital status:      Spouse name: Not on file    Number of children: Not on file    Years of education: Not on file    Highest education level: Not on file   Occupational History    Not on file   Tobacco Use    Smoking status: Never Smoker    Smokeless tobacco: Never Used   Vaping Use    Vaping Use: Never used   Substance and Sexual Activity    Alcohol use: No     Comment: rare    Drug use: No    Sexual activity: Yes Partners: Male   Other Topics Concern    Not on file   Social History Narrative    Not on file     Social Determinants of Health     Financial Resource Strain:     Difficulty of Paying Living Expenses: Not on file   Food Insecurity:     Worried About Running Out of Food in the Last Year: Not on file    Marcello of Food in the Last Year: Not on file   Transportation Needs:     Lack of Transportation (Medical): Not on file    Lack of Transportation (Non-Medical): Not on file   Physical Activity: Inactive    Days of Exercise per Week: 0 days    Minutes of Exercise per Session: 0 min   Stress:     Feeling of Stress : Not on file   Social Connections:     Frequency of Communication with Friends and Family: Not on file    Frequency of Social Gatherings with Friends and Family: Not on file    Attends Jehovah's witness Services: Not on file    Active Member of Clubs or Organizations: Not on file    Attends Club or Organization Meetings: Not on file    Marital Status: Not on file   Intimate Partner Violence: Not At Risk    Fear of Current or Ex-Partner: No    Emotionally Abused: No    Physically Abused: No    Sexually Abused: No   Housing Stability:     Unable to Pay for Housing in the Last Year: Not on file    Number of [place] in the Last Year: Not on file    Unstable Housing in the Last Year: Not on file     Family History:   Family History   Problem Relation Age of Onset    Stroke Mother     Osteoporosis Mother     Lung Cancer Father     Cancer Brother         Testicular    Diabetes Brother     Elevated Lipids Brother     Hypertension Brother      Review of System   Review of Systems   HENT: Positive for postnasal drip. Respiratory: Positive for shortness of breath. Musculoskeletal: Positive for arthralgias and back pain. Allergic/Immunologic: Positive for environmental allergies. Neurological: Positive for headaches. Hematological: Bruises/bleeds easily.    All other systems reviewed and are negative. A review of systems was done by the patient and reviewed by me. Objective:     Vitals  Vitals:    01/26/22 1400   BP: (!) 152/89   Pulse: 78   Resp: 14   Temp: 98 °F (36.7 °C)   SpO2: 98%     Physical Exam  Physical Exam  Vitals and nursing note reviewed. Constitutional:       Appearance: Normal appearance. HENT:      Head: Normocephalic. Eyes:      Conjunctiva/sclera: Conjunctivae normal.   Cardiovascular:      Rate and Rhythm: Normal rate. Pulmonary:      Effort: Pulmonary effort is normal.   Musculoskeletal:         General: Normal range of motion. Cervical back: Normal range of motion. Skin:     General: Skin is warm and dry. Neurological:      General: No focal deficit present. Mental Status: She is alert and oriented to person, place, and time. Psychiatric:         Mood and Affect: Mood normal.         Behavior: Behavior normal.         Thought Content: Thought content normal.         No results found for this visit on 01/26/22. Assessment/Plan:     Juana Puente is a 66 y.o. female with   1. History of UTI    2. Vaginal atrophy    3. Interstitial lung disease ([facility] [address]      -Records reviewed :  Last positive culture 7/2020  She has not had to use her self start macrobid in over one year. She continues her topical vaginal estrogen and cranberry supplement. She is aware to come in for culture if she were to use her self start Macrobid and it was ineffective. She will call when needs refills of estrogen cream    F/U one year or prn for urine cutlure  CRISTA Garcia - CNP      No orders of the defined types were placed in this encounter. No orders of the defined types were placed in this encounter.       CRISTA Garcia - CNP

## 2022-02-09 RX ORDER — OMEPRAZOLE 20 MG/1
20 CAPSULE, DELAYED RELEASE ORAL DAILY
COMMUNITY

## 2022-02-09 RX ORDER — DENOSUMAB 60 MG/ML
60 INJECTION SUBCUTANEOUS ONCE
COMMUNITY

## 2022-02-09 NOTE — PROGRESS NOTES
[facility] ENDOSCOPY AND OUTPATIENT  PRE-PROCEDURE INSTRUCTIONS    Procedure date__2/11/22_______  Arrival time____0915________          Procedure time__1015__________       It is not necessary to stop eating or drinking prior to this procedure. We would like you to take your medications for blood pressure as usual.  You may be asked to stop blood thinners such as Coumadin, Plavix, Fragmin, Lovenox, etc., or any anti-inflammatories such as:  Aspirin, Ibuprofen, Advil, Naproxen prior to your procedure. We also ask that you stop any OTC medications that cause additional bleeding    You must make arrangements for a responsible adult to arrive with you and stay in our waiting area during your procedure. They will also need to take you home after your procedure. For your safety you will not be allowed to leave alone or drive yourself home. Also for your safety, it is strongly suggested that someone stay with you the first 24 hours after your procedure. For your comfort, please wear simple loose fitting clothing to the center. Please do not bring valuables. If you have a living will and a durable power of  for healthcare, please bring in a copy. You will need to bring a photo ID and insurance card    Our goal is to provide you with excellent care so if you have any questions, please contact us at the [facility] at [phone]         Please note these are generalized instructions for all EGD cases, you may be provided with more specific instructions if necessaryPreoperative Screening for Elective Surgery/Invasive Procedures While COVID-19 present in the community     Have you had any of the following symptoms?   o Fever, chills  o Cough  o Shortness of breath  o Muscle aches/pain  o Diarrhea  o Abdominal pain, nausea, vomiting  o Loss or decrease in taste and / or smell   Risk of Exposure  o Have you recently been hospitalized for COVID-19 or flu-like illness, if so when?  o Recently diagnosed with COVID-19, if so when?  o Recently tested for COVID-19, if so when?  o Have you been in close contact with a person or family member who currently has or recently had COVID-23? If yes, when and in what context?  o Do you live with anybody who in the last 14 days has had fever, chills, shortness of breath, muscle aches, flu-like illness?  o Do you have any close contacts or family members who are currently in the hospital for COVID-19 or flu-like illness? If yes, assess recent close contact with this person. Indicate if the patient has a positive screen by answering yes to one or more of the above questions. Patients who test positive or screen positive prior to surgery or on the day of surgery should be evaluated in conjunction with the surgeon/proceduralist/anesthesiologist to determine the urgency of the procedure.      No to all questions above

## 2022-02-11 ENCOUNTER — APPOINTMENT (OUTPATIENT)
Dept: INTERVENTIONAL RADIOLOGY/VASCULAR | Age: 72
End: 2022-02-11
Attending: ANESTHESIOLOGY
Payer: MEDICARE

## 2022-02-11 ENCOUNTER — HOSPITAL ENCOUNTER (OUTPATIENT)
Age: 72
Setting detail: OUTPATIENT SURGERY
Discharge: HOME | End: 2022-02-11
Attending: ANESTHESIOLOGY | Admitting: ANESTHESIOLOGY
Payer: MEDICARE

## 2022-02-11 VITALS
OXYGEN SATURATION: 97 % | WEIGHT: 116 LBS | BODY MASS INDEX: 22.78 KG/M2 | RESPIRATION RATE: 16 BRPM | SYSTOLIC BLOOD PRESSURE: 149 MMHG | HEIGHT: 60 IN | DIASTOLIC BLOOD PRESSURE: 85 MMHG | TEMPERATURE: 98.6 F | HEART RATE: 90 BPM

## 2022-02-11 PROCEDURE — 3610000056 HC PAIN LEVEL 4 BASE (NON-OR): Performed by: ANESTHESIOLOGY

## 2022-02-11 PROCEDURE — 2709999900 HC NON-CHARGEABLE SUPPLY: Performed by: ANESTHESIOLOGY

## 2022-02-11 PROCEDURE — 2500000003 HC RX 250 WO HCPCS: Performed by: ANESTHESIOLOGY

## 2022-02-11 RX ORDER — BUPIVACAINE HYDROCHLORIDE 5 MG/ML
INJECTION, SOLUTION EPIDURAL; INTRACAUDAL
Status: COMPLETED | OUTPATIENT
Start: 2022-02-11 | End: 2022-02-11

## 2022-02-11 RX ORDER — LIDOCAINE HYDROCHLORIDE 10 MG/ML
INJECTION, SOLUTION EPIDURAL; INFILTRATION; INTRACAUDAL; PERINEURAL
Status: COMPLETED | OUTPATIENT
Start: 2022-02-11 | End: 2022-02-11

## 2022-02-11 ASSESSMENT — PAIN SCALES - GENERAL
PAINLEVEL_OUTOF10: 0
PAINLEVEL_OUTOF10: 0

## 2022-02-11 ASSESSMENT — PAIN - FUNCTIONAL ASSESSMENT: PAIN_FUNCTIONAL_ASSESSMENT: 0-10

## 2022-02-11 NOTE — OP NOTE
Patient:  Rishi Mendoza  YOB: 1950  Medical Record #:  [id number]   Place: [address]  Date:  2/11/2022   Physician:  Eliezer Whittington MD    PRE-PROCEDURE DIAGNOSIS: M47.816, M47.817        POST-PROCEDURE DIAGNOSIS:  M47.816, M47.817    PROCEDURE:  Bilateral 2 level medial branch block of the L4-5 and L5-S1 facets with fluoroscopy. BRIEF HISTORY: The patient presents today to the [facility] for a scheduled lumbar medial branch facet block procedure. The patient was re-evaluated today, and is clinically unchanged as compared to my previous evaluation. The patient is clinically stable to proceed with todays procedure. The previously distributed literature was reviewed with the patient today. The options, rationale and benefits of the procedure including that of being purely a diagnostic block, as well as the risks of the procedure including but not limited to infection, bleeding, paresthesia, pain, failure to relieve pain, neurologic sequelae, allergic reaction were also discussed with the patient. Informed written consent was obtained from the patient. PROCEDURE NOTE:  The patient was brought to the fluoroscopy suite and placed in the prone position. The lumbosacral area was prepped with Chloraprep and draped in the usual sterile fashion. AP fluoroscopy was obtained. Initially the target branch for the right L5 posterior primary ramus was identified at the junction between the right sacral ala medially superiorly, with the junction of the inferolateral aspect of the superior articulating facet of S1 on the sacrum. Then a 22 gauge 3.5 inch spinal needle was slowly inserted parallel to the x-ray beam towards this target end point of the right L5 posterior primary ramus until periosteum was contacted. Orthogonal fluoroscopic views confirmed proper anatomic needle tip placement at the medial branch of the posterior primary ramus of L5.   Negative aspiration was demonstrated, and a 0.5 cc of bupivacaine 0.5% without epinephrine was then injected without pain, paresthesia, difficulty or complaints, thus blocking the right L5 medial branch nerve and the needle was removed. In a similar fashion the same was done on the left side thus blocking the left L5 medial branch nerve of the posterior primary ramus. Then the right L4 posterior primary ramus was blocked by using an oblique view and using the target point as the superior medial aspect of the right L5 transverse process and inferolateral aspect of the base of the right L5 superior articulating facet. A spinal needle was slowly advanced under direct fluoroscopic guidance to this periosteal end point without pain, paresthesia, difficulty or complaints. Periosteum was contacted. Orthogonal fluoroscopic views confirmed proper anatomic needle tip placement at the right L4 posterior primary ramus medial branch. Negative aspiration was demonstrated and 0.5 cc of bupivacaine 0.5% without epinephrine was then injected without pain, paresthesia, or difficulty and the needle was removed. In a similar fashion the same was done on the left side thus blocking the left L4 medial branch. The needles were removed, the area was cleansed, a Band-Aid was placed over the injection sites. The patient tolerated the procedure well. There were no complications. The patient was transferred with accompaniment to the recovery area where the vital signs remained stable. The patients status remained stable. The patient was discharged in stable condition without difficulty accompanied by an escort, after fulfilling standard discharge criteria. The patient is asked to keep a pain diary, and asked to return for follow up with complete pain diary next available.      Estimated Blood Loss: 0ml      Plan:    Follow up next available      Office: [phone]

## 2022-02-11 NOTE — H&P
Patient:  Emily Saint  YOB: 1950  Medical Record #:  [id number]   Place: [facility]  Date:  2/11/2022   Physician:  Alex Alcala MD    History Obtained From: electronic medical record    HISTORY OF PRESENT ILLNESS    Past Medical History:        Diagnosis Date    Anemia     Arthritis     osteoporesis    Blood in stool     Chronic pancreatitis ([facility] [address]     GERD (gastroesophageal reflux disease)     Hypertension     Migraines     Osteopetrosis     Postmenopausal     Raynaud disease     Rheumatoid arthritis(714.0)     rheumatoid arthritis    Rib fracture 1975    2 ribs fracture - coughing    Vertigo      Past Surgical History:        Procedure Laterality Date    BONE MARROW BIOPSY      CHOLECYSTECTOMY      COLONOSCOPY      ENDOSCOPY, COLON, DIAGNOSTIC      ENDOSCOPY, COLON, DIAGNOSTIC  07/25/2014    EGD    FRACTURE SURGERY Left 1998    femur and tibia broke in [address] Bilateral 2006    one side taken out due to infection    TONSILLECTOMY       Medications Prior to Admission:   No current facility-administered medications on file prior to encounter.      Current Outpatient Medications on File Prior to Encounter   Medication Sig Dispense Refill    omeprazole (PRILOSEC) 20 MG delayed release capsule Take 20 mg by mouth daily      denosumab (PROLIA) 60 MG/ML SOSY SC injection Inject 60 mg into the skin once Every 6 months      azelastine (ASTELIN) 0.1 % nasal spray 1 spray by Nasal route 2 times daily Use in each nostril as directed      montelukast (SINGULAIR) 10 MG tablet       methotrexate (RHEUMATREX) 2.5 MG chemo tablet       folic acid (FOLVITE) 1 MG tablet       fluticasone (FLONASE) 50 MCG/ACT nasal spray 1 spray by Nasal route daily       dilTIAZem (DILACOR XR) 180 MG extended release capsule Take 180 mg by mouth daily      apixaban (ELIQUIS) 5 MG TABS tablet Take 5 mg by mouth 2 times daily  PREMARIN 0.625 MG/GM vaginal cream Place 0.5 g vaginally Twice a Week 1 Tube 2    RESTASIS 0.05 % ophthalmic emulsion Place 1 drop into both eyes 2 times daily  3    lisinopril (PRINIVIL;ZESTRIL) 10 MG tablet Take 20 mg by mouth nightly       acetaminophen (TYLENOL) 500 MG tablet Take 1,000 mg by mouth every 6 hours as needed for Pain       Multiple Vitamins-Minerals (MULTIVITAMIN PO) Take 1 tablet by mouth daily.  ascorbic acid (VITAMIN C) 500 MG tablet Take 500 mg by mouth daily.  Cholecalciferol (VITAMIN D3) 3000 UNITS TABS Take 1 tablet by mouth daily. Allergies:  Sulfa antibiotics, Venofer [iron sucrose], Demerol hcl [meperidine], and Erythromycin  Social History     Socioeconomic History    Marital status:      Spouse name: Not on file    Number of children: Not on file    Years of education: Not on file    Highest education level: Not on file   Occupational History    Not on file   Tobacco Use    Smoking status: Never Smoker    Smokeless tobacco: Never Used   Vaping Use    Vaping Use: Never used   Substance and Sexual Activity    Alcohol use: No     Comment: rare    Drug use: No    Sexual activity: Yes     Partners: Male   Other Topics Concern    Not on file   Social History Narrative    Not on file     Social Determinants of Health     Financial Resource Strain:     Difficulty of Paying Living Expenses: Not on file   Food Insecurity:     Worried About Running Out of Food in the Last Year: Not on file    Marcello of Food in the Last Year: Not on file   Transportation Needs:     Lack of Transportation (Medical): Not on file    Lack of Transportation (Non-Medical):  Not on file   Physical Activity: Inactive    Days of Exercise per Week: 0 days    Minutes of Exercise per Session: 0 min   Stress:     Feeling of Stress : Not on file   Social Connections:     Frequency of Communication with Friends and Family: Not on file    Frequency of Social Gatherings with Friends and Family: Not on file    Attends Gnosticism Services: Not on file    Active Member of Clubs or Organizations: Not on file    Attends Club or Organization Meetings: Not on file    Marital Status: Not on file   Intimate Partner Violence: Not At Risk    Fear of Current or Ex-Partner: No    Emotionally Abused: No    Physically Abused: No    Sexually Abused: No   Housing Stability:     Unable to Pay for Housing in the Last Year: Not on file    Number of [place] in the Last Year: Not on file    Unstable Housing in the Last Year: Not on file     Family History   Problem Relation Age of Onset    Stroke Mother     Osteoporosis Mother     Lung Cancer Father     Cancer Brother         Testicular    Diabetes Brother     Elevated Lipids Brother     Hypertension Brother          PHYSICAL EXAM:      Ht 5' 1\" (1.549 m)   Wt 120 lb (54.4 kg)   BMI 22.67 kg/m²  I            ASSESSMENT AND PLAN:    1. Procedure. options, risks and benefits reviewed with patient and expresses understanding.

## 2022-02-22 NOTE — PROGRESS NOTES
[facility] ENDOSCOPY AND OUTPATIENT  PRE-PROCEDURE INSTRUCTIONS    Procedure date__2/25/22______  Arrival time__1015__________          Procedure time_____1115_______       It is not necessary to stop eating or drinking prior to this procedure. We would like you to take your medications for blood pressure as usual.  You may be asked to stop blood thinners such as Coumadin, Plavix, Fragmin, Lovenox, etc., or any anti-inflammatories such as:  Aspirin, Ibuprofen, Advil, Naproxen prior to your procedure. We also ask that you stop any OTC medications that cause additional bleeding    You must make arrangements for a responsible adult to arrive with you and stay in our waiting area during your procedure. They will also need to take you home after your procedure. For your safety you will not be allowed to leave alone or drive yourself home. Also for your safety, it is strongly suggested that someone stay with you the first 24 hours after your procedure. For your comfort, please wear simple loose fitting clothing to the center. Please do not bring valuables. If you have a living will and a durable power of  for healthcare, please bring in a copy. You will need to bring a photo ID and insurance card    Our goal is to provide you with excellent care so if you have any questions, please contact us at the [facility] at [phone]         Please note these are generalized instructions for all EGD cases, you may be provided with more specific instructions if necessaryPreoperative Screening for Elective Surgery/Invasive Procedures While COVID-19 present in the community     Have you had any of the following symptoms?   o Fever, chills  o Cough  o Shortness of breath  o Muscle aches/pain  o Diarrhea  o Abdominal pain, nausea, vomiting  o Loss or decrease in taste and / or smell   Risk of Exposure  o Have you recently been hospitalized for COVID-19 or flu-like illness, if so when?  o Recently diagnosed with COVID-19, if so when?  o Recently tested for COVID-19, if so when?  o Have you been in close contact with a person or family member who currently has or recently had COVID-23? If yes, when and in what context?  o Do you live with anybody who in the last 14 days has had fever, chills, shortness of breath, muscle aches, flu-like illness?  o Do you have any close contacts or family members who are currently in the hospital for COVID-19 or flu-like illness? If yes, assess recent close contact with this person. Indicate if the patient has a positive screen by answering yes to one or more of the above questions. Patients who test positive or screen positive prior to surgery or on the day of surgery should be evaluated in conjunction with the surgeon/proceduralist/anesthesiologist to determine the urgency of the procedure. No to all above questions.

## 2022-02-25 ENCOUNTER — HOSPITAL ENCOUNTER (OUTPATIENT)
Age: 72
Setting detail: OUTPATIENT SURGERY
Discharge: HOME | End: 2022-02-25
Attending: ANESTHESIOLOGY | Admitting: ANESTHESIOLOGY
Payer: MEDICARE

## 2022-02-25 ENCOUNTER — APPOINTMENT (OUTPATIENT)
Dept: INTERVENTIONAL RADIOLOGY/VASCULAR | Age: 72
End: 2022-02-25
Attending: ANESTHESIOLOGY
Payer: MEDICARE

## 2022-02-25 VITALS
DIASTOLIC BLOOD PRESSURE: 93 MMHG | WEIGHT: 118 LBS | SYSTOLIC BLOOD PRESSURE: 158 MMHG | HEIGHT: 61 IN | RESPIRATION RATE: 16 BRPM | TEMPERATURE: 96.9 F | OXYGEN SATURATION: 99 % | BODY MASS INDEX: 22.28 KG/M2 | HEART RATE: 90 BPM

## 2022-02-25 PROCEDURE — 3610000056 HC PAIN LEVEL 4 BASE (NON-OR): Performed by: ANESTHESIOLOGY

## 2022-02-25 PROCEDURE — 2500000003 HC RX 250 WO HCPCS: Performed by: ANESTHESIOLOGY

## 2022-02-25 PROCEDURE — 2709999900 HC NON-CHARGEABLE SUPPLY: Performed by: ANESTHESIOLOGY

## 2022-02-25 RX ORDER — LIDOCAINE HYDROCHLORIDE 10 MG/ML
INJECTION, SOLUTION EPIDURAL; INFILTRATION; INTRACAUDAL; PERINEURAL
Status: COMPLETED | OUTPATIENT
Start: 2022-02-25 | End: 2022-02-25

## 2022-02-25 RX ORDER — BUPIVACAINE HYDROCHLORIDE 5 MG/ML
INJECTION, SOLUTION EPIDURAL; INTRACAUDAL
Status: COMPLETED | OUTPATIENT
Start: 2022-02-25 | End: 2022-02-25

## 2022-02-25 ASSESSMENT — PAIN SCALES - GENERAL
PAINLEVEL_OUTOF10: 7
PAINLEVEL_OUTOF10: 0

## 2022-02-25 ASSESSMENT — PAIN DESCRIPTION - PAIN TYPE: TYPE: CHRONIC PAIN

## 2022-02-25 ASSESSMENT — PAIN - FUNCTIONAL ASSESSMENT
PAIN_FUNCTIONAL_ASSESSMENT: PREVENTS OR INTERFERES SOME ACTIVE ACTIVITIES AND ADLS
PAIN_FUNCTIONAL_ASSESSMENT: PREVENTS OR INTERFERES SOME ACTIVE ACTIVITIES AND ADLS
PAIN_FUNCTIONAL_ASSESSMENT: 0-10

## 2022-02-25 ASSESSMENT — PAIN DESCRIPTION - LOCATION: LOCATION: BACK

## 2022-02-25 ASSESSMENT — PAIN DESCRIPTION - PROGRESSION: CLINICAL_PROGRESSION: NOT CHANGED

## 2022-02-25 ASSESSMENT — PAIN DESCRIPTION - DESCRIPTORS
DESCRIPTORS: ACHING
DESCRIPTORS: DULL;DISCOMFORT

## 2022-02-25 ASSESSMENT — PAIN DESCRIPTION - ONSET: ONSET: GRADUAL

## 2022-02-25 ASSESSMENT — PAIN DESCRIPTION - FREQUENCY: FREQUENCY: CONTINUOUS

## 2022-02-25 ASSESSMENT — PAIN DESCRIPTION - ORIENTATION: ORIENTATION: RIGHT;MID

## 2022-02-25 NOTE — H&P
Patient:  Bozena Kiser  YOB: 1950  Medical Record #:  [id number]   Place: [facility]  Date:  2/25/2022   Physician:  Ada Gama MD    History Obtained From: electronic medical record    HISTORY OF PRESENT ILLNESS    Past Medical History:        Diagnosis Date    Anemia     Arthritis     osteoporesis    Blood in stool     Chronic pancreatitis ([address]     GERD (gastroesophageal reflux disease)     Hypertension     Migraines     Osteopetrosis     Postmenopausal     Raynaud disease     Rheumatoid arthritis(714.0)     rheumatoid arthritis    Rib fracture 1975    2 ribs fracture - coughing    Vertigo      Past Surgical History:        Procedure Laterality Date    BONE MARROW BIOPSY      CHOLECYSTECTOMY      COLONOSCOPY      ENDOSCOPY, COLON, DIAGNOSTIC      ENDOSCOPY, COLON, DIAGNOSTIC  07/25/2014    EGD    FRACTURE SURGERY Left 1998    femur and tibia broke in [address] PAIN MANAGEMENT PROCEDURE Bilateral 2/11/2022    BILATERAL TWO LEVEL LUMBAR MEDIAL BRANCH BLOCKS AT L4-5 AND L5-S1 performed by Karina Real MD at [address] Bilateral 2006    one side taken out due to infection    TONSILLECTOMY       Medications Prior to Admission:   No current facility-administered medications on file prior to encounter.      Current Outpatient Medications on File Prior to Encounter   Medication Sig Dispense Refill    omeprazole (PRILOSEC) 20 MG delayed release capsule Take 20 mg by mouth daily      denosumab (PROLIA) 60 MG/ML SOSY SC injection Inject 60 mg into the skin once Every 6 months      azelastine (ASTELIN) 0.1 % nasal spray 1 spray by Nasal route 2 times daily Use in each nostril as directed      montelukast (SINGULAIR) 10 MG tablet       methotrexate (RHEUMATREX) 2.5 MG chemo tablet       folic acid (FOLVITE) 1 MG tablet       fluticasone (FLONASE) 50 MCG/ACT nasal spray 1 spray by Nasal route daily       dilTIAZem (DILACOR XR) 180 MG extended release capsule Take 180 mg by mouth daily      apixaban (ELIQUIS) 5 MG TABS tablet Take 5 mg by mouth 2 times daily      PREMARIN 0.625 MG/GM vaginal cream Place 0.5 g vaginally Twice a Week 1 Tube 2    RESTASIS 0.05 % ophthalmic emulsion Place 1 drop into both eyes 2 times daily  3    lisinopril (PRINIVIL;ZESTRIL) 10 MG tablet Take 20 mg by mouth nightly       acetaminophen (TYLENOL) 500 MG tablet Take 1,000 mg by mouth every 6 hours as needed for Pain       Multiple Vitamins-Minerals (MULTIVITAMIN PO) Take 1 tablet by mouth daily.  ascorbic acid (VITAMIN C) 500 MG tablet Take 500 mg by mouth daily.  Cholecalciferol (VITAMIN D3) 3000 UNITS TABS Take 1 tablet by mouth daily. Allergies:  Sulfa antibiotics, Venofer [iron sucrose], Demerol hcl [meperidine], and Erythromycin  Social History     Socioeconomic History    Marital status:      Spouse name: Not on file    Number of children: Not on file    Years of education: Not on file    Highest education level: Not on file   Occupational History    Not on file   Tobacco Use    Smoking status: Never Smoker    Smokeless tobacco: Never Used   Vaping Use    Vaping Use: Never used   Substance and Sexual Activity    Alcohol use: No     Comment: rare    Drug use: No    Sexual activity: Yes     Partners: Male   Other Topics Concern    Not on file   Social History Narrative    Not on file     Social Determinants of Health     Financial Resource Strain:     Difficulty of Paying Living Expenses: Not on file   Food Insecurity:     Worried About Running Out of Food in the Last Year: Not on file    Marcello of Food in the Last Year: Not on file   Transportation Needs:     Lack of Transportation (Medical): Not on file    Lack of Transportation (Non-Medical):  Not on file   Physical Activity: Inactive    Days of Exercise per Week: 0 days    Minutes of Exercise per Session: 0 min   Stress:     Feeling of Stress : Not on file   Social Connections:     Frequency of Communication with Friends and Family: Not on file    Frequency of Social Gatherings with Friends and Family: Not on file    Attends Islam Services: Not on file    Active Member of [address] [place] [address] or Organizations: Not on file    Attends Club or Organization Meetings: Not on file    Marital Status: Not on file   Intimate Partner Violence: Not At Risk    Fear of Current or Ex-Partner: No    Emotionally Abused: No    Physically Abused: No    Sexually Abused: No   Housing Stability:     Unable to Pay for Housing in the Last Year: Not on file    Number of [place] in the Last Year: Not on file    Unstable Housing in the Last Year: Not on file     Family History   Problem Relation Age of Onset    Stroke Mother     Osteoporosis Mother     Lung Cancer Father     Cancer Brother         Testicular    Diabetes Brother     Elevated Lipids Brother     Hypertension Brother          PHYSICAL EXAM:      Ht 5' 1\" (1.549 m)   Wt 116 lb (52.6 kg)   BMI 21.92 kg/m²  I            ASSESSMENT AND PLAN:    1. Procedure. options, risks and benefits reviewed with patient and expresses understanding.

## 2022-02-25 NOTE — OP NOTE
Patient:  Familia Anne  YOB: 1950  Medical Record #:  [id number]   Place: [facility]  Date:  2/25/2022   Physician:  Ju Peña MD    PRE-PROCEDURE DIAGNOSIS: M47.816, M47.817        POST-PROCEDURE DIAGNOSIS:  M47.816, M47.817    PROCEDURE:  Bilateral 2 level medial branch block of the L4-5 and L5-S1 facets with fluoroscopy. BRIEF HISTORY: The patient presents today to the [facility] for a scheduled lumbar medial branch facet block procedure. The patient was re-evaluated today, and is clinically unchanged as compared to my previous evaluation. The patient is clinically stable to proceed with todays procedure. The previously distributed literature was reviewed with the patient today. The options, rationale and benefits of the procedure including that of being purely a diagnostic block, as well as the risks of the procedure including but not limited to infection, bleeding, paresthesia, pain, failure to relieve pain, neurologic sequelae, allergic reaction were also discussed with the patient. Informed written consent was obtained from the patient. PROCEDURE NOTE:  The patient was brought to the fluoroscopy suite and placed in the prone position. The lumbosacral area was prepped with Chloraprep and draped in the usual sterile fashion. AP fluoroscopy was obtained. Initially the target branch for the right L5 posterior primary ramus was identified at the junction between the right sacral ala medially superiorly, with the junction of the inferolateral aspect of the superior articulating facet of S1 on the sacrum. Then a 22 gauge 3.5 inch spinal needle was slowly inserted parallel to the x-ray beam towards this target end point of the right L5 posterior primary ramus until periosteum was contacted. Orthogonal fluoroscopic views confirmed proper anatomic needle tip placement at the medial branch of the posterior primary ramus of L5.   Negative aspiration was demonstrated, and a 0.5 cc of bupivacaine 0.5% without epinephrine was then injected without pain, paresthesia, difficulty or complaints, thus blocking the right L5 medial branch nerve and the needle was removed. In a similar fashion the same was done on the left side thus blocking the left L5 medial branch nerve of the posterior primary ramus. Then the right L4 posterior primary ramus was blocked by using an oblique view and using the target point as the superior medial aspect of the right L5 transverse process and inferolateral aspect of the base of the right L5 superior articulating facet. A spinal needle was slowly advanced under direct fluoroscopic guidance to this periosteal end point without pain, paresthesia, difficulty or complaints. Periosteum was contacted. Orthogonal fluoroscopic views confirmed proper anatomic needle tip placement at the right L4 posterior primary ramus medial branch. Negative aspiration was demonstrated and 0.5 cc of bupivacaine 0.5% without epinephrine was then injected without pain, paresthesia, or difficulty and the needle was removed. In a similar fashion the same was done on the left side thus blocking the left L4 medial branch. The needles were removed, the area was cleansed, a Band-Aid was placed over the injection sites. The patient tolerated the procedure well. There were no complications. The patient was transferred with accompaniment to the recovery area where the vital signs remained stable. The patients status remained stable. The patient was discharged in stable condition without difficulty accompanied by an escort, after fulfilling standard discharge criteria. The patient is asked to keep a pain diary, and asked to return for follow up with complete pain diary next available.      Estimated Blood Loss: 0ml      Plan:    Follow up next available      Office: [phone]

## 2022-04-11 ENCOUNTER — TELEPHONE (OUTPATIENT)
Dept: ENDOCRINOLOGY | Age: 72
End: 2022-04-11

## 2022-04-11 NOTE — TELEPHONE ENCOUNTER
Spoke with Dr. Najma Workman earlier. Question about where to have next DXA. I prefer here and he is OK with that.

## 2022-04-11 NOTE — TELEPHONE ENCOUNTER
Patient sees endocrinologist Dr Guanakito Dill with [facility], Dr Guanakito Dill would like to speak to Dr Bhupinder Nieves. Please call on his moble at [phone].

## 2022-04-12 ENCOUNTER — TELEPHONE (OUTPATIENT)
Dept: UROGYNECOLOGY | Age: 72
End: 2022-04-12

## 2022-04-12 ENCOUNTER — OFFICE VISIT (OUTPATIENT)
Dept: UROGYNECOLOGY | Age: 72
End: 2022-04-12
Payer: MEDICARE

## 2022-04-12 VITALS
TEMPERATURE: 98 F | OXYGEN SATURATION: 98 % | SYSTOLIC BLOOD PRESSURE: 124 MMHG | DIASTOLIC BLOOD PRESSURE: 83 MMHG | HEART RATE: 112 BPM | RESPIRATION RATE: 14 BRPM

## 2022-04-12 DIAGNOSIS — N39.0 RECURRENT UTI: Primary | ICD-10-CM

## 2022-04-12 DIAGNOSIS — R39.15 URINARY URGENCY: ICD-10-CM

## 2022-04-12 DIAGNOSIS — R35.0 URINARY FREQUENCY: ICD-10-CM

## 2022-04-12 LAB
BILIRUBIN, POC: NORMAL
BLOOD URINE, POC: NORMAL
CLARITY, POC: CLEAR
COLOR, POC: YELLOW
GLUCOSE URINE, POC: NORMAL
KETONES, POC: NORMAL
LEUKOCYTE EST, POC: NORMAL
NITRITE, POC: POSITIVE
PH, POC: 6.5
PROTEIN, POC: NORMAL
SPECIFIC GRAVITY, POC: 1.01
UROBILINOGEN, POC: NORMAL

## 2022-04-12 PROCEDURE — 81002 URINALYSIS NONAUTO W/O SCOPE: CPT | Performed by: NURSE PRACTITIONER

## 2022-04-12 PROCEDURE — 1123F ACP DISCUSS/DSCN MKR DOCD: CPT | Performed by: NURSE PRACTITIONER

## 2022-04-12 PROCEDURE — 1036F TOBACCO NON-USER: CPT | Performed by: NURSE PRACTITIONER

## 2022-04-12 PROCEDURE — 4040F PNEUMOC VAC/ADMIN/RCVD: CPT | Performed by: NURSE PRACTITIONER

## 2022-04-12 PROCEDURE — G8420 CALC BMI NORM PARAMETERS: HCPCS | Performed by: NURSE PRACTITIONER

## 2022-04-12 PROCEDURE — 3017F COLORECTAL CA SCREEN DOC REV: CPT | Performed by: NURSE PRACTITIONER

## 2022-04-12 PROCEDURE — 99213 OFFICE O/P EST LOW 20 MIN: CPT | Performed by: NURSE PRACTITIONER

## 2022-04-12 PROCEDURE — G8427 DOCREV CUR MEDS BY ELIG CLIN: HCPCS | Performed by: NURSE PRACTITIONER

## 2022-04-12 PROCEDURE — G8400 PT W/DXA NO RESULTS DOC: HCPCS | Performed by: NURSE PRACTITIONER

## 2022-04-12 PROCEDURE — 1090F PRES/ABSN URINE INCON ASSESS: CPT | Performed by: NURSE PRACTITIONER

## 2022-04-12 RX ORDER — CIPROFLOXACIN 500 MG/1
500 TABLET, FILM COATED ORAL 2 TIMES DAILY
Qty: 14 TABLET | Refills: 0 | Status: SHIPPED | OUTPATIENT
Start: 2022-04-12 | End: 2022-04-19

## 2022-04-12 NOTE — PROGRESS NOTES
4/12/2022       HPI:     Name: Meryle Bailey  YOB: 1950    CC: Patient is a 66 y.o. presenting for evaluation of urine check. HPI: How long have you had this problem? About 2 weeks  Please rate the severity of your problem: moderately severe  Anything make it better? She has taken her self start macrobid, but it has not helped. She has burning, frequency, and urgency. Last positive culture taken when Macrobid was ineffective was 7/2020:  Component 7/22/20 1223   Organism Klebsiella pneumoniae Abnormal     Urine Culture, Routine >100,000 CFU/ml    Resulting Agency [address] [facility]          Susceptibility      Klebsiella pneumoniae (1)    Antibiotic Interpretation Microscan  Method Status    ampicillin Resistant >=32 mcg/mL BACTERIAL SUSCEPTIBILITY PANEL BY HILDA     ceFAZolin Sensitive <=4 mcg/mL BACTERIAL SUSCEPTIBILITY PANEL BY HILDA      NOTE: Cefazolin should only be used for uncomplicated UTI         for E.coli or Klebsiella pneumoniae. cefepime Sensitive <=0.12 mcg/mL BACTERIAL SUSCEPTIBILITY PANEL BY HILDA     cefTRIAXone Sensitive <=0.25 mcg/mL BACTERIAL SUSCEPTIBILITY PANEL BY HILDA     ciprofloxacin Sensitive 0.5 mcg/mL BACTERIAL SUSCEPTIBILITY PANEL BY HILDA     ertapenem Sensitive <=0.12 mcg/mL BACTERIAL SUSCEPTIBILITY PANEL BY HILDA     gentamicin Sensitive <=1 mcg/mL BACTERIAL SUSCEPTIBILITY PANEL BY HILDA     levofloxacin Sensitive 1 mcg/mL BACTERIAL SUSCEPTIBILITY PANEL BY HILDA     nitrofurantoin Resistant 256 mcg/mL BACTERIAL SUSCEPTIBILITY PANEL BY HILDA     piperacillin-tazobactam Sensitive 8 mcg/mL BACTERIAL SUSCEPTIBILITY PANEL BY HILDA     trimethoprim-sulfamethoxazole Sensitive <=20 mcg/mL BACTERIAL SUSCEPTIBILITY PANEL BY HILDA                      Ob/Gyn History:    OB History   No obstetric history on file.      Past Medical History:   Past Medical History:   Diagnosis Date    Anemia     Arthritis     osteoporesis    Blood in stool     Chronic [address] ([facility] [address]     GERD (gastroesophageal reflux disease)     Hypertension     Migraines     Osteopetrosis     Postmenopausal     Raynaud disease     Rheumatoid arthritis(714.0)     rheumatoid arthritis    Rib fracture 1975    2 ribs fracture - coughing    Vertigo      Past Surgical History:   Past Surgical History:   Procedure Laterality Date    BONE MARROW BIOPSY      CHOLECYSTECTOMY      COLONOSCOPY      ENDOSCOPY, COLON, DIAGNOSTIC      ENDOSCOPY, COLON, DIAGNOSTIC  07/25/2014    EGD    FRACTURE SURGERY Left 1998    femur and tibia broke in [facility]    HYSTERECTOMY      KNEE SURGERY      PAIN MANAGEMENT PROCEDURE Bilateral 2/11/2022    BILATERAL TWO LEVEL LUMBAR MEDIAL BRANCH BLOCKS AT L4-5 AND L5-S1 performed by Yosi Frederick MD at [address] Bilateral 2/25/2022    BILATERAL TWO LEVEL LUMBAR MEDIAL BRANCH BLOCKS AT L4-5 AND L5-S1 performed by Yosi Frederick MD at [address] Bilateral 2006    one side taken out due to infection    TONSILLECTOMY       Allergies:    Allergies   Allergen Reactions    Sulfa Antibiotics     Venofer [Iron Sucrose]     Demerol Hcl [Meperidine] Nausea And Vomiting    Erythromycin Diarrhea and Nausea And Vomiting     Current Medications:  Current Outpatient Medications   Medication Sig Dispense Refill    ciprofloxacin (CIPRO) 500 MG tablet Take 1 tablet by mouth 2 times daily for 7 days 14 tablet 0    omeprazole (PRILOSEC) 20 MG delayed release capsule Take 20 mg by mouth daily      denosumab (PROLIA) 60 MG/ML SOSY SC injection Inject 60 mg into the skin once Every 6 months      azelastine (ASTELIN) 0.1 % nasal spray 1 spray by Nasal route 2 times daily Use in each nostril as directed      montelukast (SINGULAIR) 10 MG tablet       methotrexate (RHEUMATREX) 2.5 MG chemo tablet       folic acid (FOLVITE) 1 MG tablet       fluticasone (FLONASE) 50 MCG/ACT nasal spray 1 spray by Nasal route daily       dilTIAZem (DILACOR XR) 180 MG extended release capsule Take 180 mg by mouth daily      apixaban (ELIQUIS) 5 MG TABS tablet Take 5 mg by mouth 2 times daily      PREMARIN 0.625 MG/GM vaginal cream Place 0.5 g vaginally Twice a Week 1 Tube 2    RESTASIS 0.05 % ophthalmic emulsion Place 1 drop into both eyes 2 times daily  3    lisinopril (PRINIVIL;ZESTRIL) 10 MG tablet Take 20 mg by mouth nightly       acetaminophen (TYLENOL) 500 MG tablet Take 1,000 mg by mouth every 6 hours as needed for Pain       Multiple Vitamins-Minerals (MULTIVITAMIN PO) Take 1 tablet by mouth daily.  ascorbic acid (VITAMIN C) 500 MG tablet Take 500 mg by mouth daily.  Cholecalciferol (VITAMIN D3) 3000 UNITS TABS Take 1 tablet by mouth daily. No current facility-administered medications for this visit. Social History:   Social History     Socioeconomic History    Marital status:      Spouse name: Not on file    Number of children: Not on file    Years of education: Not on file    Highest education level: Not on file   Occupational History    Not on file   Tobacco Use    Smoking status: Never Smoker    Smokeless tobacco: Never Used   Vaping Use    Vaping Use: Never used   Substance and Sexual Activity    Alcohol use: No     Comment: rare    Drug use: No    Sexual activity: Yes     Partners: Male   Other Topics Concern    Not on file   Social History Narrative    Not on file     Social Determinants of Health     Financial Resource Strain:     Difficulty of Paying Living Expenses: Not on file   Food Insecurity:     Worried About Running Out of Food in the Last Year: Not on file    Marcello of Food in the Last Year: Not on file   Transportation Needs:     Lack of Transportation (Medical): Not on file    Lack of Transportation (Non-Medical):  Not on file   Physical Activity: Inactive    Days of Exercise per Week: 0 days    Minutes of Exercise per Session: 0 min   Stress:     Feeling of Stress : Not on file   Social Connections:     Frequency of Communication with Friends and Family: Not on file    Frequency of Social Gatherings with Friends and Family: Not on file    Attends Anglican Services: Not on file    Active Member of Clubs or Organizations: Not on file    Attends Club or Organization Meetings: Not on file    Marital Status: Not on file   Intimate Partner Violence: Not At Risk    Fear of Current or Ex-Partner: No    Emotionally Abused: No    Physically Abused: No    Sexually Abused: No   Housing Stability:     Unable to Pay for Housing in the Last Year: Not on file    Number of [place] in the Last Year: Not on file    Unstable Housing in the Last Year: Not on file     Family History:   Family History   Problem Relation Age of Onset    Stroke Mother     Osteoporosis Mother     Lung Cancer Father     Cancer Brother         Testicular    Diabetes Brother     Elevated Lipids Brother     Hypertension Brother      Review of System   Review of Systems   Genitourinary: Positive for dyspareunia, frequency and urgency. All other systems reviewed and are negative. A review of systems was done by the patient and reviewed by me. Objective:     Vitals  Vitals:    04/12/22 1403   BP: 124/83   Pulse: 112   Resp: 14   Temp: 98 °F (36.7 °C)   SpO2: 98%     Physical Exam  Physical Exam  Vitals and nursing note reviewed. Constitutional:       Appearance: Normal appearance. HENT:      Head: Normocephalic. Eyes:      Conjunctiva/sclera: Conjunctivae normal.   Cardiovascular:      Rate and Rhythm: Normal rate. Pulmonary:      Effort: Pulmonary effort is normal.   Musculoskeletal:         General: Normal range of motion. Cervical back: Normal range of motion. Skin:     General: Skin is warm and dry. Neurological:      General: No focal deficit present. Mental Status: She is alert.    Psychiatric:         Mood and Affect: Mood normal.         Behavior: Behavior normal. Straight urinary catheterization performed by sterile procedure for urine specimen per Nicol Gilmore NP. Patient tolerated well. Nicol Gilmore NP made aware. Results for POC orders placed in visit on 04/12/22   POCT Urinalysis no Micro   Result Value Ref Range    Color, UA yellow     Clarity, UA clear     Glucose, UA POC neg     Bilirubin, UA neg     Ketones, UA neg     Spec Grav, UA 1.015     Blood, UA POC neg     pH, UA 6.5     Protein, UA POC neg     Urobilinogen, UA neg     Leukocytes, UA large     Nitrite, UA positive        Assessment/Plan:     Lorraine Alvarez is a 66 y.o. female with   1. Recurrent UTI    2. Urinary urgency    3. Urinary frequency      -Nadira:  Self start macrobid did not alleviate symptoms. Urine sent for culture  She will begin Cipro while awaiting results  Plan to change self start treatment upon culture review  CRISTA Cline CNP      Orders Placed This Encounter   Procedures    Culture, Urine     Order Specific Question:   Specify (ex-cath, midstream, cysto, etc)?      Answer:   straight cath    Insert sol catheter    POCT Urinalysis no Micro     Orders Placed This Encounter   Medications    ciprofloxacin (CIPRO) 500 MG tablet     Sig: Take 1 tablet by mouth 2 times daily for 7 days     Dispense:  14 tablet     Refill:  0       CRISTA Cline CNP

## 2022-04-12 NOTE — TELEPHONE ENCOUNTER
Pt states that she has an UTI and would like an urine test. Pt states that she has been taking marcobid and she does not feel like it is working. Please advise.         [address] [place] [address]

## 2022-04-14 LAB
ORGANISM: ABNORMAL
URINE CULTURE, ROUTINE: ABNORMAL

## 2022-04-14 RX ORDER — CIPROFLOXACIN 500 MG/1
500 TABLET, FILM COATED ORAL 2 TIMES DAILY
Qty: 12 TABLET | Refills: 0 | Status: SHIPPED | OUTPATIENT
Start: 2022-04-14

## 2022-04-14 NOTE — RESULT ENCOUNTER NOTE
Spoke with patient to confirm she was on the Cipro for her positive culture and let her know this would be her new self start mediation for in the future and that we sent a script to her pharmacy. She was thankful for call and had no questions at this time.

## 2022-11-28 ENCOUNTER — TELEPHONE (OUTPATIENT)
Dept: UROGYNECOLOGY | Age: 72
End: 2022-11-28

## 2022-11-28 NOTE — TELEPHONE ENCOUNTER
Called patient and she states she is having UTI symptoms and has tried her self start therapy at home and it is not working. Patient to come in for urine check tomorrow and see Leydi Cervantes.

## 2022-11-28 NOTE — TELEPHONE ENCOUNTER
Pt called and said she took the emergency meds  for uti but still does not feel like everything is right

## 2022-11-29 ENCOUNTER — OFFICE VISIT (OUTPATIENT)
Dept: UROGYNECOLOGY | Age: 72
End: 2022-11-29
Payer: MEDICARE

## 2022-11-29 VITALS
SYSTOLIC BLOOD PRESSURE: 132 MMHG | RESPIRATION RATE: 16 BRPM | OXYGEN SATURATION: 95 % | HEART RATE: 108 BPM | TEMPERATURE: 98.8 F | DIASTOLIC BLOOD PRESSURE: 87 MMHG

## 2022-11-29 DIAGNOSIS — R30.0 DYSURIA: ICD-10-CM

## 2022-11-29 DIAGNOSIS — N39.0 RECURRENT UTI: Primary | ICD-10-CM

## 2022-11-29 DIAGNOSIS — R35.0 INCREASED URINARY FREQUENCY: ICD-10-CM

## 2022-11-29 LAB
BILIRUBIN, POC: NORMAL
BLOOD URINE, POC: NORMAL
CLARITY, POC: CLEAR
COLOR, POC: YELLOW
GLUCOSE URINE, POC: NORMAL
KETONES, POC: NORMAL
LEUKOCYTE EST, POC: NORMAL
NITRITE, POC: NORMAL
PH, POC: 6.5
PROTEIN, POC: NORMAL
SPECIFIC GRAVITY, POC: 1.01
UROBILINOGEN, POC: NORMAL

## 2022-11-29 PROCEDURE — 1036F TOBACCO NON-USER: CPT | Performed by: NURSE PRACTITIONER

## 2022-11-29 PROCEDURE — 51701 INSERT BLADDER CATHETER: CPT | Performed by: NURSE PRACTITIONER

## 2022-11-29 PROCEDURE — G8427 DOCREV CUR MEDS BY ELIG CLIN: HCPCS | Performed by: NURSE PRACTITIONER

## 2022-11-29 PROCEDURE — 1090F PRES/ABSN URINE INCON ASSESS: CPT | Performed by: NURSE PRACTITIONER

## 2022-11-29 PROCEDURE — 3078F DIAST BP <80 MM HG: CPT | Performed by: NURSE PRACTITIONER

## 2022-11-29 PROCEDURE — 3074F SYST BP LT 130 MM HG: CPT | Performed by: NURSE PRACTITIONER

## 2022-11-29 PROCEDURE — G8420 CALC BMI NORM PARAMETERS: HCPCS | Performed by: NURSE PRACTITIONER

## 2022-11-29 PROCEDURE — 1123F ACP DISCUSS/DSCN MKR DOCD: CPT | Performed by: NURSE PRACTITIONER

## 2022-11-29 PROCEDURE — G8400 PT W/DXA NO RESULTS DOC: HCPCS | Performed by: NURSE PRACTITIONER

## 2022-11-29 PROCEDURE — 3017F COLORECTAL CA SCREEN DOC REV: CPT | Performed by: NURSE PRACTITIONER

## 2022-11-29 PROCEDURE — G8484 FLU IMMUNIZE NO ADMIN: HCPCS | Performed by: NURSE PRACTITIONER

## 2022-11-29 PROCEDURE — 99213 OFFICE O/P EST LOW 20 MIN: CPT | Performed by: NURSE PRACTITIONER

## 2022-11-29 PROCEDURE — 81002 URINALYSIS NONAUTO W/O SCOPE: CPT | Performed by: NURSE PRACTITIONER

## 2022-11-29 RX ORDER — PHENAZOPYRIDINE HYDROCHLORIDE 100 MG/1
100 TABLET, FILM COATED ORAL 3 TIMES DAILY PRN
Qty: 9 TABLET | Refills: 0 | Status: SHIPPED | OUTPATIENT
Start: 2022-11-29 | End: 2022-12-02

## 2022-11-29 RX ORDER — CIPROFLOXACIN 500 MG/1
500 TABLET, FILM COATED ORAL 2 TIMES DAILY
Qty: 10 TABLET | Refills: 0 | Status: SHIPPED | OUTPATIENT
Start: 2022-11-29 | End: 2022-12-04
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
## 2022-12-02 LAB — URINE CULTURE, ROUTINE: NORMAL

## 2023-01-06 ENCOUNTER — TELEPHONE (OUTPATIENT)
Dept: ENDOCRINOLOGY | Age: 73
End: 2023-01-06

## 2023-01-09 NOTE — TELEPHONE ENCOUNTER
FYI: Patient has an appointment Nargis Salazar for parathyroid on 2/8/23. Possible parathyroid surgery. Patient will let the office know of the outcome.

## 2023-01-25 ENCOUNTER — OFFICE VISIT (OUTPATIENT)
Dept: UROGYNECOLOGY | Age: 73
End: 2023-01-25
Payer: MEDICARE

## 2023-01-25 VITALS
HEART RATE: 103 BPM | TEMPERATURE: 98.6 F | DIASTOLIC BLOOD PRESSURE: 89 MMHG | OXYGEN SATURATION: 99 % | SYSTOLIC BLOOD PRESSURE: 134 MMHG | RESPIRATION RATE: 16 BRPM

## 2023-01-25 DIAGNOSIS — R39.15 URINARY URGENCY: ICD-10-CM

## 2023-01-25 DIAGNOSIS — R35.0 URINARY FREQUENCY: ICD-10-CM

## 2023-01-25 DIAGNOSIS — R35.0 INCREASED URINARY FREQUENCY: Primary | ICD-10-CM

## 2023-01-25 DIAGNOSIS — Z87.440 HISTORY OF UTI: ICD-10-CM

## 2023-01-25 DIAGNOSIS — N95.2 VAGINAL ATROPHY: ICD-10-CM

## 2023-01-25 PROCEDURE — 3079F DIAST BP 80-89 MM HG: CPT | Performed by: OBSTETRICS & GYNECOLOGY

## 2023-01-25 PROCEDURE — 1090F PRES/ABSN URINE INCON ASSESS: CPT | Performed by: OBSTETRICS & GYNECOLOGY

## 2023-01-25 PROCEDURE — G8427 DOCREV CUR MEDS BY ELIG CLIN: HCPCS | Performed by: OBSTETRICS & GYNECOLOGY

## 2023-01-25 PROCEDURE — G8400 PT W/DXA NO RESULTS DOC: HCPCS | Performed by: OBSTETRICS & GYNECOLOGY

## 2023-01-25 PROCEDURE — 3017F COLORECTAL CA SCREEN DOC REV: CPT | Performed by: OBSTETRICS & GYNECOLOGY

## 2023-01-25 PROCEDURE — 1036F TOBACCO NON-USER: CPT | Performed by: OBSTETRICS & GYNECOLOGY

## 2023-01-25 PROCEDURE — G8420 CALC BMI NORM PARAMETERS: HCPCS | Performed by: OBSTETRICS & GYNECOLOGY

## 2023-01-25 PROCEDURE — 1123F ACP DISCUSS/DSCN MKR DOCD: CPT | Performed by: OBSTETRICS & GYNECOLOGY

## 2023-01-25 PROCEDURE — 3075F SYST BP GE 130 - 139MM HG: CPT | Performed by: OBSTETRICS & GYNECOLOGY

## 2023-01-25 PROCEDURE — 99214 OFFICE O/P EST MOD 30 MIN: CPT | Performed by: OBSTETRICS & GYNECOLOGY

## 2023-01-25 PROCEDURE — G8484 FLU IMMUNIZE NO ADMIN: HCPCS | Performed by: OBSTETRICS & GYNECOLOGY

## 2023-01-25 RX ORDER — DILTIAZEM HYDROCHLORIDE 120 MG/1
CAPSULE, COATED, EXTENDED RELEASE ORAL
COMMUNITY
Start: 2023-01-25

## 2023-01-25 RX ORDER — LISINOPRIL 20 MG/1
TABLET ORAL
COMMUNITY
Start: 2023-01-23
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
## 2023-02-13 ENCOUNTER — PROCEDURE VISIT (OUTPATIENT)
Dept: ENDOCRINOLOGY | Age: 73
End: 2023-02-13

## 2023-02-13 ENCOUNTER — OFFICE VISIT (OUTPATIENT)
Dept: ENDOCRINOLOGY | Age: 73
End: 2023-02-13
Payer: MEDICARE

## 2023-02-13 ENCOUNTER — TELEPHONE (OUTPATIENT)
Dept: ENDOCRINOLOGY | Age: 73
End: 2023-02-13

## 2023-02-13 ENCOUNTER — HOSPITAL ENCOUNTER (OUTPATIENT)
Dept: GENERAL RADIOLOGY | Age: 73
Discharge: HOME | End: 2023-02-13
Payer: MEDICARE

## 2023-02-13 VITALS
SYSTOLIC BLOOD PRESSURE: 153 MMHG | HEIGHT: 60 IN | WEIGHT: 108.6 LBS | BODY MASS INDEX: 21.32 KG/M2 | DIASTOLIC BLOOD PRESSURE: 93 MMHG

## 2023-02-13 DIAGNOSIS — M81.0 OSTEOPOROSIS, POSTMENOPAUSAL: ICD-10-CM

## 2023-02-13 DIAGNOSIS — M81.0 OSTEOPOROSIS, POSTMENOPAUSAL: Primary | ICD-10-CM

## 2023-02-13 PROCEDURE — G8484 FLU IMMUNIZE NO ADMIN: HCPCS | Performed by: INTERNAL MEDICINE

## 2023-02-13 PROCEDURE — 1123F ACP DISCUSS/DSCN MKR DOCD: CPT | Performed by: INTERNAL MEDICINE

## 2023-02-13 PROCEDURE — 77080 DXA BONE DENSITY AXIAL: CPT

## 2023-02-13 PROCEDURE — 1090F PRES/ABSN URINE INCON ASSESS: CPT | Performed by: INTERNAL MEDICINE

## 2023-02-13 PROCEDURE — 77080 DXA BONE DENSITY AXIAL: CPT | Performed by: INTERNAL MEDICINE

## 2023-02-13 PROCEDURE — 99214 OFFICE O/P EST MOD 30 MIN: CPT | Performed by: INTERNAL MEDICINE

## 2023-02-13 PROCEDURE — 1036F TOBACCO NON-USER: CPT | Performed by: INTERNAL MEDICINE

## 2023-02-13 PROCEDURE — 3080F DIAST BP >= 90 MM HG: CPT | Performed by: INTERNAL MEDICINE

## 2023-02-13 PROCEDURE — G8420 CALC BMI NORM PARAMETERS: HCPCS | Performed by: INTERNAL MEDICINE

## 2023-02-13 PROCEDURE — 3077F SYST BP >= 140 MM HG: CPT | Performed by: INTERNAL MEDICINE

## 2023-02-13 PROCEDURE — 3017F COLORECTAL CA SCREEN DOC REV: CPT | Performed by: INTERNAL MEDICINE

## 2023-02-13 PROCEDURE — G8399 PT W/DXA RESULTS DOCUMENT: HCPCS | Performed by: INTERNAL MEDICINE

## 2023-02-13 PROCEDURE — G8427 DOCREV CUR MEDS BY ELIG CLIN: HCPCS | Performed by: INTERNAL MEDICINE

## 2023-02-13 NOTE — RESULT ENCOUNTER NOTE
[facility]) Osteoporosis and [address] [facility], [address] [place] [address],[place] [address]  Phone [phone]  Fax [phone]    NAME: Clay Leonard   : 1950   STUDY DATE: 2023     REFERRING PROVIDER: Wilfrido Hill MD    INDICATION(S) FOR PERFORMING THE STUDY:  osteoporosis, age related (M81.0)    CLINICAL INFORMATION PROVIDED BY THE PATIENT: 69-year-old woman. Surgical menopause age 38 Fx T5, T8, T11, L2 in  or before. Worsening L1 fx 2021. Surgery for hyperparathyroidism in . Previous treatments: Fosamax (dates uncertain), Reclast [phone], Forteo [phone]. Current treatment is Prolia started . Family history of osteoporosis (mother). EQUIPMENT: [employer]. POSITIONING: Good. REGIONS OF INTEREST: Correct. ARTIFACTS: None. STUDY VALID? Yes. Spine BMD is spuriously high because of generalized degenerative change. I deleted L1 and L2 in  or before due to T-score discrepancy. T-scores   Initial study: 2008* L3-L4 -3.4 left fem. neck -2.5   Current study: 2023 L3-L4 -4.1 left fem. neck -3.0     The table below shows bone mineral density (grams/cm2), the appropriate measure for comparing serial scans. A significant increase or decrease is based on precision studies done at our center according to the ISCD protocol with a least significant change of 0.030 g/cm2. PA spine Proximal Femur (left)   Date L3-L4 Fem. neck Trochanter Total hip   2008* 0.691 0.554 0.387 0.638   10/22/2014 0.701 0.475 --- 0.651   10/23/2015 0.699 0.494 --- 0.655   10/24/2016 0.675 0.496 --- 0.647   10/26/2017 0.736 0.503 --- 0.628   2018* 0.882 0.533 --- 0.678   10/30/2019 0.682 0.509 --- 0.633   2020* 0.841 0.510 --- 0.678   2021 0.702 --- 0.616   2023 0.654 0.514 0.505 0.595   *Done with [employer] equipment. BMD converted to Hologic units. IMPRESSION:  BONE DENSITY IS LOW, CONSISTENT WITH OSTEOPOROSIS.  SINCE THE LAST DXA, BMD IS HIGHER IN THE FEMORAL NECK BUT LOWER IN THE SPINE. THIS COULD BE DUE TO DIFFERENCES IN EQUIPMENT (2023 IS THE FIRST TEST ON THIS MACHINE). Consider repeating this study in 1-2 years to assess the patient's progress. _________________________________________________   Toro Pringle MD, Director, [facility]) Osteoporosis and [address] [place] [address]
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
## 2023-02-13 NOTE — TELEPHONE ENCOUNTER
The patient is having my chart problems.      She wants Dr. April Hoyt to have the following message:     Vitamin d3 is 2000 iu    Last prolia was 12/8/2022    Next is 06/09/2023    [phone]

## 2025-03-04 ENCOUNTER — HOSPITAL ENCOUNTER (EMERGENCY)
Age: 75
Discharge: HOME | End: 2025-03-04
Payer: MEDICARE

## 2025-03-04 VITALS
DIASTOLIC BLOOD PRESSURE: 91 MMHG | TEMPERATURE: 98.2 F | SYSTOLIC BLOOD PRESSURE: 170 MMHG | HEART RATE: 103 BPM | BODY MASS INDEX: 21.44 KG/M2 | WEIGHT: 109.35 LBS | OXYGEN SATURATION: 96 % | RESPIRATION RATE: 16 BRPM

## 2025-03-04 DIAGNOSIS — E83.42 HYPOMAGNESEMIA: ICD-10-CM

## 2025-03-04 DIAGNOSIS — N30.00 ACUTE CYSTITIS WITHOUT HEMATURIA: Primary | ICD-10-CM

## 2025-03-04 DIAGNOSIS — I10 ESSENTIAL HYPERTENSION: ICD-10-CM

## 2025-03-04 LAB
ALBUMIN SERPL-MCNC: 5.1 G/DL (ref 3.4–5)
ALBUMIN/GLOB SERPL: 1.6 {RATIO} (ref 1.1–2.2)
ALP SERPL-CCNC: 128 U/L (ref 40–129)
ALT SERPL-CCNC: 16 U/L (ref 10–40)
ANION GAP SERPL CALCULATED.3IONS-SCNC: 7 MMOL/L (ref 3–16)
AST SERPL-CCNC: 32 U/L (ref 15–37)
BACTERIA URNS QL MICRO: ABNORMAL /HPF
BASOPHILS # BLD: 0.1 K/UL (ref 0–0.2)
BASOPHILS NFR BLD: 0.5 %
BILIRUB SERPL-MCNC: 0.3 MG/DL (ref 0–1)
BILIRUB UR QL STRIP.AUTO: NEGATIVE
BUN SERPL-MCNC: 13 MG/DL (ref 7–20)
CALCIUM SERPL-MCNC: 9.9 MG/DL (ref 8.3–10.6)
CHLORIDE SERPL-SCNC: 106 MMOL/L (ref 99–110)
CLARITY UR: CLEAR
CO2 SERPL-SCNC: 23 MMOL/L (ref 21–32)
COLOR UR: YELLOW
CREAT SERPL-MCNC: 0.7 MG/DL (ref 0.6–1.2)
DEPRECATED RDW RBC AUTO: 14.5 % (ref 12.4–15.4)
EOSINOPHIL # BLD: 0.1 K/UL (ref 0–0.6)
EOSINOPHIL NFR BLD: 1.1 %
EPI CELLS #/AREA URNS AUTO: 0 /HPF (ref 0–5)
GFR SERPLBLD CREATININE-BSD FMLA CKD-EPI: 90 ML/MIN/{1.73_M2}
GLUCOSE SERPL-MCNC: 98 MG/DL (ref 70–99)
GLUCOSE UR STRIP.AUTO-MCNC: NEGATIVE MG/DL
HCT VFR BLD AUTO: 43.6 % (ref 36–48)
HGB BLD-MCNC: 14.5 G/DL (ref 12–16)
HGB UR QL STRIP.AUTO: ABNORMAL
HYALINE CASTS #/AREA URNS AUTO: 0 /LPF (ref 0–8)
KETONES UR STRIP.AUTO-MCNC: NEGATIVE MG/DL
LACTATE BLDV-SCNC: 0.9 MMOL/L (ref 0.4–1.9)
LEUKOCYTE ESTERASE UR QL STRIP.AUTO: ABNORMAL
LYMPHOCYTES # BLD: 0.8 K/UL (ref 1–5.1)
LYMPHOCYTES NFR BLD: 6.4 %
MAGNESIUM SERPL-MCNC: 1.73 MG/DL (ref 1.8–2.4)
MCH RBC QN AUTO: 32.5 PG (ref 26–34)
MCHC RBC AUTO-ENTMCNC: 33.3 G/DL (ref 31–36)
MCV RBC AUTO: 97.5 FL (ref 80–100)
MONOCYTES # BLD: 0.8 K/UL (ref 0–1.3)
MONOCYTES NFR BLD: 6.9 %
NEUTROPHILS # BLD: 10.1 K/UL (ref 1.7–7.7)
NEUTROPHILS NFR BLD: 85.1 %
NITRITE UR QL STRIP.AUTO: NEGATIVE
PH UR STRIP.AUTO: 7 [PH] (ref 5–8)
PLATELET # BLD AUTO: 260 K/UL (ref 135–450)
PMV BLD AUTO: 7.9 FL (ref 5–10.5)
POTASSIUM SERPL-SCNC: 3.7 MMOL/L (ref 3.5–5.1)
PROT SERPL-MCNC: 8.2 G/DL (ref 6.4–8.2)
PROT UR STRIP.AUTO-MCNC: NEGATIVE MG/DL
RBC # BLD AUTO: 4.47 M/UL (ref 4–5.2)
RBC CLUMPS #/AREA URNS AUTO: 1 /HPF (ref 0–4)
SODIUM SERPL-SCNC: 136 MMOL/L (ref 136–145)
SP GR UR STRIP.AUTO: 1 (ref 1–1.03)
UA COMPLETE W REFLEX CULTURE PNL UR: YES
UA DIPSTICK W REFLEX MICRO PNL UR: YES
URN SPEC COLLECT METH UR: ABNORMAL
UROBILINOGEN UR STRIP-ACNC: 0.2 E.U./DL
WBC # BLD AUTO: 11.9 K/UL (ref 4–11)
WBC #/AREA URNS AUTO: 78 /HPF (ref 0–5)

## 2025-03-04 PROCEDURE — 96374 THER/PROPH/DIAG INJ IV PUSH: CPT

## 2025-03-04 PROCEDURE — 81001 URINALYSIS AUTO W/SCOPE: CPT

## 2025-03-04 PROCEDURE — 80053 COMPREHEN METABOLIC PANEL: CPT

## 2025-03-04 PROCEDURE — 6360000002 HC RX W HCPCS: Performed by: PHYSICIAN ASSISTANT

## 2025-03-04 PROCEDURE — 83735 ASSAY OF MAGNESIUM: CPT

## 2025-03-04 PROCEDURE — 99284 EMERGENCY DEPT VISIT MOD MDM: CPT

## 2025-03-04 PROCEDURE — 87077 CULTURE AEROBIC IDENTIFY: CPT

## 2025-03-04 PROCEDURE — 87186 SC STD MICRODIL/AGAR DIL: CPT

## 2025-03-04 PROCEDURE — 87086 URINE CULTURE/COLONY COUNT: CPT

## 2025-03-04 PROCEDURE — 85025 COMPLETE CBC W/AUTO DIFF WBC: CPT

## 2025-03-04 PROCEDURE — 83605 ASSAY OF LACTIC ACID: CPT

## 2025-03-04 PROCEDURE — 6370000000 HC RX 637 (ALT 250 FOR IP): Performed by: PHYSICIAN ASSISTANT

## 2025-03-04 RX ORDER — CEFDINIR 300 MG/1
300 CAPSULE ORAL 2 TIMES DAILY
Qty: 20 CAPSULE | Refills: 0 | Status: SHIPPED | OUTPATIENT
Start: 2025-03-04 | End: 2025-03-14

## 2025-03-04 RX ORDER — CEFDINIR 300 MG/1
300 CAPSULE ORAL ONCE
Status: COMPLETED | OUTPATIENT
Start: 2025-03-04 | End: 2025-03-04

## 2025-03-04 RX ORDER — PHENAZOPYRIDINE HYDROCHLORIDE 100 MG/1
200 TABLET, FILM COATED ORAL ONCE
Status: COMPLETED | OUTPATIENT
Start: 2025-03-04 | End: 2025-03-04

## 2025-03-04 RX ORDER — LANOLIN ALCOHOL/MO/W.PET/CERES
400 CREAM (GRAM) TOPICAL ONCE
Status: COMPLETED | OUTPATIENT
Start: 2025-03-04 | End: 2025-03-04

## 2025-03-04 RX ORDER — KETOROLAC TROMETHAMINE 15 MG/ML
15 INJECTION, SOLUTION INTRAMUSCULAR; INTRAVENOUS ONCE
Status: COMPLETED | OUTPATIENT
Start: 2025-03-04 | End: 2025-03-04

## 2025-03-04 RX ADMIN — CEFDINIR 300 MG: 300 CAPSULE ORAL at 22:35

## 2025-03-04 RX ADMIN — Medication 400 MG: at 22:35

## 2025-03-04 RX ADMIN — KETOROLAC TROMETHAMINE 15 MG: 15 INJECTION, SOLUTION INTRAMUSCULAR; INTRAVENOUS at 21:21

## 2025-03-04 RX ADMIN — PHENAZOPYRIDINE 200 MG: 100 TABLET ORAL at 21:19

## 2025-03-04 ASSESSMENT — LIFESTYLE VARIABLES
HOW OFTEN DO YOU HAVE A DRINK CONTAINING ALCOHOL: NEVER
HOW MANY STANDARD DRINKS CONTAINING ALCOHOL DO YOU HAVE ON A TYPICAL DAY: PATIENT DOES NOT DRINK

## 2025-03-04 ASSESSMENT — PAIN SCALES - GENERAL: PAINLEVEL_OUTOF10: 7
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
## 2025-03-05 NOTE — ED TRIAGE NOTES
Pt c/o 8/10 burning with urination, increased frequency, and pain to lower abd area since yesterday. Pt states frequent UTI.

## 2025-03-05 NOTE — ED NOTES
Provider order placed for patient's discharge. Provider reviewed decision to discharge with the patient. Discharge paperwork and any prescriptions were reviewed with the patient. Patient verbalized understanding of discharge education and any prescriptions and has no further questions or further needs at this time. Patient left with all personal belongings and was stable upon departure. Patient thanked for choosing [facility] and informed to return should any need arise.

## 2025-03-05 NOTE — DISCHARGE INSTRUCTIONS
URINARY TRACT INFECTIONS      Urinary tract infections are very common.  Women are more often affected then men.  Different bacteria can cause urinary tract infections.  The source of most of these organisms is from the patient’s own intestine or groin.      1. To completely clear the infection, take the antibiotic prescribed until all of the pills are gone-- even if you feel better.    2. Drink plenty of fluids.    3. You may continue to have burning or frequent urination for 2 to 3 days after treatment has been started.    4. If burning, frequent urination, or fever continues for greater than 3 days, call your physician or return to the Emergency Department.    5. No alcohol.    6. Drink cranberry juice, grape juice, or other juices daily.    7. Always urinate following sexual intercourse to remove any germs that may remain around the opening to the bladder.    8. Take medication as prescribed by your physician.  Your doctor may have prescribed medicine that will make your urine bright orange, so do not be alarmed if this occurs.

## 2025-03-07 LAB
BACTERIA UR CULT: ABNORMAL
ORGANISM: ABNORMAL

## 2025-05-02 ENCOUNTER — APPOINTMENT (OUTPATIENT)
Dept: CT IMAGING | Age: 75
DRG: 389 | End: 2025-05-02
Payer: MEDICARE

## 2025-05-02 ENCOUNTER — HOSPITAL ENCOUNTER (INPATIENT)
Age: 75
LOS: 2 days | Discharge: HOME | DRG: 389 | End: 2025-05-04
Attending: FAMILY MEDICINE | Admitting: FAMILY MEDICINE
Payer: MEDICARE

## 2025-05-02 DIAGNOSIS — K56.609 SMALL BOWEL OBSTRUCTION (HCC): Primary | ICD-10-CM

## 2025-05-02 LAB
ALBUMIN SERPL-MCNC: 4.6 G/DL (ref 3.4–5)
ALBUMIN/GLOB SERPL: 1.6 {RATIO} (ref 1.1–2.2)
ALP SERPL-CCNC: 132 U/L (ref 40–129)
ALT SERPL-CCNC: 13 U/L (ref 10–40)
ANION GAP SERPL CALCULATED.3IONS-SCNC: 14 MMOL/L (ref 3–16)
AST SERPL-CCNC: 36 U/L (ref 15–37)
BASOPHILS # BLD: 0.1 K/UL (ref 0–0.2)
BASOPHILS NFR BLD: 0.8 %
BILIRUB SERPL-MCNC: 0.8 MG/DL (ref 0–1)
BUN SERPL-MCNC: 17 MG/DL (ref 7–20)
CALCIUM SERPL-MCNC: 10.3 MG/DL (ref 8.3–10.6)
CHLORIDE SERPL-SCNC: 104 MMOL/L (ref 99–110)
CO2 SERPL-SCNC: 21 MMOL/L (ref 21–32)
CREAT SERPL-MCNC: 0.9 MG/DL (ref 0.6–1.2)
DEPRECATED RDW RBC AUTO: 14.3 % (ref 12.4–15.4)
EOSINOPHIL # BLD: 0 K/UL (ref 0–0.6)
EOSINOPHIL NFR BLD: 0.1 %
GFR SERPLBLD CREATININE-BSD FMLA CKD-EPI: 66 ML/MIN/{1.73_M2}
GLUCOSE SERPL-MCNC: 104 MG/DL (ref 70–99)
HCT VFR BLD AUTO: 43.7 % (ref 36–48)
HGB BLD-MCNC: 14.6 G/DL (ref 12–16)
LIPASE SERPL-CCNC: 30 U/L (ref 13–60)
LYMPHOCYTES # BLD: 0.8 K/UL (ref 1–5.1)
LYMPHOCYTES NFR BLD: 6.4 %
MAGNESIUM SERPL-MCNC: 1.61 MG/DL (ref 1.8–2.4)
MCH RBC QN AUTO: 30.3 PG (ref 26–34)
MCHC RBC AUTO-ENTMCNC: 33.5 G/DL (ref 31–36)
MCV RBC AUTO: 90.5 FL (ref 80–100)
MONOCYTES # BLD: 0.9 K/UL (ref 0–1.3)
MONOCYTES NFR BLD: 7.6 %
NEUTROPHILS # BLD: 10.5 K/UL (ref 1.7–7.7)
NEUTROPHILS NFR BLD: 85.1 %
PLATELET # BLD AUTO: 363 K/UL (ref 135–450)
PMV BLD AUTO: 8.5 FL (ref 5–10.5)
POTASSIUM SERPL-SCNC: 4.4 MMOL/L (ref 3.5–5.1)
PROT SERPL-MCNC: 7.4 G/DL (ref 6.4–8.2)
RBC # BLD AUTO: 4.82 M/UL (ref 4–5.2)
SODIUM SERPL-SCNC: 139 MMOL/L (ref 136–145)
WBC # BLD AUTO: 12.4 K/UL (ref 4–11)

## 2025-05-02 PROCEDURE — 83735 ASSAY OF MAGNESIUM: CPT

## 2025-05-02 PROCEDURE — 99285 EMERGENCY DEPT VISIT HI MDM: CPT

## 2025-05-02 PROCEDURE — APPSS15 APP SPLIT SHARED TIME 0-15 MINUTES: Performed by: PHYSICIAN ASSISTANT

## 2025-05-02 PROCEDURE — 6370000000 HC RX 637 (ALT 250 FOR IP): Performed by: FAMILY MEDICINE

## 2025-05-02 PROCEDURE — 80053 COMPREHEN METABOLIC PANEL: CPT

## 2025-05-02 PROCEDURE — APPNB15 APP NON BILLABLE TIME 0-15 MINS: Performed by: PHYSICIAN ASSISTANT

## 2025-05-02 PROCEDURE — 6360000002 HC RX W HCPCS: Performed by: PHYSICIAN ASSISTANT

## 2025-05-02 PROCEDURE — 85025 COMPLETE CBC W/AUTO DIFF WBC: CPT

## 2025-05-02 PROCEDURE — 96374 THER/PROPH/DIAG INJ IV PUSH: CPT

## 2025-05-02 PROCEDURE — 2580000003 HC RX 258: Performed by: PHYSICIAN ASSISTANT

## 2025-05-02 PROCEDURE — 96375 TX/PRO/DX INJ NEW DRUG ADDON: CPT

## 2025-05-02 PROCEDURE — 1200000000 HC SEMI PRIVATE

## 2025-05-02 PROCEDURE — 2580000003 HC RX 258: Performed by: FAMILY MEDICINE

## 2025-05-02 PROCEDURE — 74177 CT ABD & PELVIS W/CONTRAST: CPT

## 2025-05-02 PROCEDURE — 36415 COLL VENOUS BLD VENIPUNCTURE: CPT

## 2025-05-02 PROCEDURE — 6360000002 HC RX W HCPCS: Performed by: FAMILY MEDICINE

## 2025-05-02 PROCEDURE — 6360000004 HC RX CONTRAST MEDICATION: Performed by: PHYSICIAN ASSISTANT

## 2025-05-02 PROCEDURE — 83690 ASSAY OF LIPASE: CPT

## 2025-05-02 PROCEDURE — 99222 1ST HOSP IP/OBS MODERATE 55: CPT | Performed by: SURGERY

## 2025-05-02 RX ORDER — ONDANSETRON 2 MG/ML
4 INJECTION INTRAMUSCULAR; INTRAVENOUS EVERY 6 HOURS PRN
Status: DISCONTINUED | OUTPATIENT
Start: 2025-05-02 | End: 2025-05-04 | Stop reason: HOSPADM

## 2025-05-02 RX ORDER — PANTOPRAZOLE SODIUM 40 MG/1
40 TABLET, DELAYED RELEASE ORAL
Status: DISCONTINUED | OUTPATIENT
Start: 2025-05-03 | End: 2025-05-04 | Stop reason: HOSPADM

## 2025-05-02 RX ORDER — POTASSIUM CHLORIDE 7.45 MG/ML
10 INJECTION INTRAVENOUS PRN
Status: DISCONTINUED | OUTPATIENT
Start: 2025-05-02 | End: 2025-05-04 | Stop reason: HOSPADM

## 2025-05-02 RX ORDER — SODIUM CHLORIDE 0.9 % (FLUSH) 0.9 %
5-40 SYRINGE (ML) INJECTION EVERY 12 HOURS SCHEDULED
Status: DISCONTINUED | OUTPATIENT
Start: 2025-05-02 | End: 2025-05-04 | Stop reason: HOSPADM

## 2025-05-02 RX ORDER — POLYETHYLENE GLYCOL 3350 17 G/17G
17 POWDER, FOR SOLUTION ORAL DAILY PRN
Status: DISCONTINUED | OUTPATIENT
Start: 2025-05-02 | End: 2025-05-04 | Stop reason: HOSPADM

## 2025-05-02 RX ORDER — SODIUM CHLORIDE 0.9 % (FLUSH) 0.9 %
5-40 SYRINGE (ML) INJECTION PRN
Status: DISCONTINUED | OUTPATIENT
Start: 2025-05-02 | End: 2025-05-04 | Stop reason: HOSPADM

## 2025-05-02 RX ORDER — DILTIAZEM HYDROCHLORIDE 240 MG/1
240 CAPSULE, COATED, EXTENDED RELEASE ORAL DAILY
COMMUNITY
Start: 2025-04-01

## 2025-05-02 RX ORDER — ENOXAPARIN SODIUM 100 MG/ML
30 INJECTION SUBCUTANEOUS
Status: DISCONTINUED | OUTPATIENT
Start: 2025-05-02 | End: 2025-05-04 | Stop reason: HOSPADM

## 2025-05-02 RX ORDER — IOPAMIDOL 755 MG/ML
75 INJECTION, SOLUTION INTRAVASCULAR
Status: COMPLETED | OUTPATIENT
Start: 2025-05-02 | End: 2025-05-02

## 2025-05-02 RX ORDER — ACETAMINOPHEN 650 MG/1
650 SUPPOSITORY RECTAL EVERY 6 HOURS PRN
Status: DISCONTINUED | OUTPATIENT
Start: 2025-05-02 | End: 2025-05-04 | Stop reason: HOSPADM

## 2025-05-02 RX ORDER — DILTIAZEM HYDROCHLORIDE 240 MG/1
240 CAPSULE, COATED, EXTENDED RELEASE ORAL DAILY
Status: DISCONTINUED | OUTPATIENT
Start: 2025-05-02 | End: 2025-05-04 | Stop reason: HOSPADM

## 2025-05-02 RX ORDER — POTASSIUM CHLORIDE 1500 MG/1
40 TABLET, EXTENDED RELEASE ORAL PRN
Status: DISCONTINUED | OUTPATIENT
Start: 2025-05-02 | End: 2025-05-04 | Stop reason: HOSPADM

## 2025-05-02 RX ORDER — 0.9 % SODIUM CHLORIDE 0.9 %
1000 INTRAVENOUS SOLUTION INTRAVENOUS ONCE
Status: COMPLETED | OUTPATIENT
Start: 2025-05-02 | End: 2025-05-02

## 2025-05-02 RX ORDER — MORPHINE SULFATE 4 MG/ML
4 INJECTION, SOLUTION INTRAMUSCULAR; INTRAVENOUS ONCE
Refills: 0 | Status: COMPLETED | OUTPATIENT
Start: 2025-05-02 | End: 2025-05-02

## 2025-05-02 RX ORDER — SODIUM CHLORIDE 9 MG/ML
INJECTION, SOLUTION INTRAVENOUS CONTINUOUS
Status: ACTIVE | OUTPATIENT
Start: 2025-05-02 | End: 2025-05-03

## 2025-05-02 RX ORDER — ONDANSETRON 4 MG/1
4 TABLET, ORALLY DISINTEGRATING ORAL EVERY 8 HOURS PRN
Status: DISCONTINUED | OUTPATIENT
Start: 2025-05-02 | End: 2025-05-04 | Stop reason: HOSPADM

## 2025-05-02 RX ORDER — ACETAMINOPHEN 325 MG/1
650 TABLET ORAL EVERY 6 HOURS PRN
Status: DISCONTINUED | OUTPATIENT
Start: 2025-05-02 | End: 2025-05-04 | Stop reason: HOSPADM

## 2025-05-02 RX ORDER — SODIUM CHLORIDE 9 MG/ML
INJECTION, SOLUTION INTRAVENOUS PRN
Status: DISCONTINUED | OUTPATIENT
Start: 2025-05-02 | End: 2025-05-04 | Stop reason: HOSPADM

## 2025-05-02 RX ORDER — LISINOPRIL 40 MG/1
40 TABLET ORAL DAILY
Status: DISCONTINUED | OUTPATIENT
Start: 2025-05-02 | End: 2025-05-04 | Stop reason: HOSPADM

## 2025-05-02 RX ORDER — MAGNESIUM SULFATE IN WATER 40 MG/ML
2000 INJECTION, SOLUTION INTRAVENOUS PRN
Status: DISCONTINUED | OUTPATIENT
Start: 2025-05-02 | End: 2025-05-04 | Stop reason: HOSPADM

## 2025-05-02 RX ORDER — ONDANSETRON 2 MG/ML
4 INJECTION INTRAMUSCULAR; INTRAVENOUS ONCE
Status: COMPLETED | OUTPATIENT
Start: 2025-05-02 | End: 2025-05-02

## 2025-05-02 RX ADMIN — LISINOPRIL 40 MG: 40 TABLET ORAL at 18:43

## 2025-05-02 RX ADMIN — SODIUM CHLORIDE 1000 ML: 0.9 INJECTION, SOLUTION INTRAVENOUS at 11:30

## 2025-05-02 RX ADMIN — IOPAMIDOL 75 ML: 755 INJECTION, SOLUTION INTRAVENOUS at 12:33

## 2025-05-02 RX ADMIN — DILTIAZEM HYDROCHLORIDE 240 MG: 240 CAPSULE, EXTENDED RELEASE ORAL at 18:43

## 2025-05-02 RX ADMIN — ENOXAPARIN SODIUM 30 MG: 100 INJECTION SUBCUTANEOUS at 22:01

## 2025-05-02 RX ADMIN — ACETAMINOPHEN 650 MG: 325 TABLET ORAL at 19:52

## 2025-05-02 RX ADMIN — MORPHINE SULFATE 4 MG: 4 INJECTION, SOLUTION INTRAMUSCULAR; INTRAVENOUS at 11:31

## 2025-05-02 RX ADMIN — SODIUM CHLORIDE: 0.9 INJECTION, SOLUTION INTRAVENOUS at 18:04

## 2025-05-02 RX ADMIN — ONDANSETRON 4 MG: 2 INJECTION, SOLUTION INTRAMUSCULAR; INTRAVENOUS at 11:30

## 2025-05-02 ASSESSMENT — LIFESTYLE VARIABLES
HOW OFTEN DO YOU HAVE A DRINK CONTAINING ALCOHOL: MONTHLY OR LESS
HOW MANY STANDARD DRINKS CONTAINING ALCOHOL DO YOU HAVE ON A TYPICAL DAY: 1 OR 2

## 2025-05-02 ASSESSMENT — PAIN SCALES - GENERAL
PAINLEVEL_OUTOF10: 8
PAINLEVEL_OUTOF10: 6

## 2025-05-02 ASSESSMENT — PAIN DESCRIPTION - LOCATION: LOCATION: HEAD

## 2025-05-02 NOTE — ED TRIAGE NOTES
Pt states that she has been experiencing mid to right sided upper abdominal pain, nausea, vomiting, and diarrhea since last night. Pt states that she has hx of pancreatitis. Pt denies fever/chills. Pt states that PCP told her to go to ED. Pt AAO x 4. VSS.

## 2025-05-02 NOTE — H&P
V2.0  History and Physical      Name:  Susana Palma /Age/Sex: 1950  (74 y.o. female)   MRN & CSN:  [id number] & [id number] Encounter Date/Time: 2025 4:30 PM EDT   Location:  [address] [place] [address] PCP: Sadia Gonzalez MD       Hospital Day: 1    Assessment and Plan:   Susana Palma is a 74 y.o. female with a pmh of rheumatoid HTN, chronic pancreatitis, paroxysmal A-fib, GERD who presents with SBO (small bowel obstruction) ([facility])    Hospital Problems           Last Modified POA    * (Principal) SBO (small bowel obstruction) ([facility]) 2025 Yes       Plan:  SBO  Presenting with abdominal pain, nausea without vomiting  Had diarrhea for the last few days  CT abdomen pelvis shows SBO with transition point in the pelvis  NG tube if patient started decompensating or starts to have vomiting or discomfort  IV fluid resuscitation  N.p.o.  Pain control  General Surgery consult    2.  Paroxysmal A-fib  Currently in sinus rhythm  Currently on Eliquis 5 mg twice daily will hold just in case for surgical intervention  Cardizem to 40 mg daily    3.  Essential hypertension  Lisinopril 40 mg daily    4.  GERD  Omeprazole 20 mg daily    Will continue to follow, monitor and manage chronic medical conditions with medication listed below    Disposition:   Current Living situation: Home  Expected Disposition: Home  Estimated D/C: 2-3 days  Advanced care planning was discussed with patient for a total of 16 minutes.  Full code, DNR CC, DNR CCA, power of  and living will were discussed. Patient elected to be a DNR CCA   DVT Prophylaxis [] Lovenox, []  Heparin, [x] SCDs, [x] Ambulation,  [] Eliquis, [] Xarelto, [] Coumadin   Code Status DNR CCA   Surrogate Decision Maker/ POA Self     Personally reviewed Lab Studies and Imaging           History from:     patient, ED physician, medical records    History of Present Illness:     Chief Complaint: SBO  Susana Palma is a 74 y.o. female with a pmh of rheumatoid HTN, chronic  (Bilateral, 2/25/2022).  Allergies:   Allergies   Allergen Reactions    Sulfamethoxazole-Trimethoprim Nausea Only    Adhesive Tape      Tape    Magnesium Diarrhea    Sulfa Antibiotics     Venofer [Iron Sucrose]     Demerol Hcl [Meperidine] Nausea And Vomiting    Erythromycin Diarrhea and Nausea And Vomiting     Fam HX:  family history includes Cancer in her brother; Diabetes in her brother; Elevated Lipids in her brother; Hypertension in her brother; Lung Cancer in her father; Osteoporosis in her mother; Stroke in her mother.  Soc HX:   Social History     Socioeconomic History    Marital status:      Spouse name: None    Number of children: None    Years of education: None    Highest education level: None   Tobacco Use    Smoking status: Never    Smokeless tobacco: Never   Vaping Use    Vaping status: Never Used   Substance and Sexual Activity    Alcohol use: No     Comment: rare    Drug use: No    Sexual activity: Yes     Partners: Male     Social Drivers of Health     Food Insecurity: Not At Risk (3/5/2025)    Received from [employer] [facility] [employer]    Food Insecurities     Within the past 12 months, did you worry that your food would run out before you got money to buy more?: No     Within the past 12 months, did the food you bought just not last and you didn't have money to get more?: No   Transportation Needs: Not At Risk (3/5/2025)    Received from [employer] [facility] [employer]    Transportation     Within the past 12 months, has a lack of transportation kept you from medical appointments or from doing things needed for daily living?: No   Physical Activity: Inactive (12/14/2021)    Exercise Vital Sign     Days of Exercise per Week: 0 days     Minutes of Exercise per Session: 0 min   Stress: No Stress Concern Present (9/23/2019)    Received from [employer]    [employer] - Occupational Stress Questionnaire     Feeling

## 2025-05-02 NOTE — ED PROVIDER NOTES
**ADVANCED PRACTICE PROVIDER, I HAVE EVALUATED THIS PATIENT**        [facility] EMERGENCY DEPARTMENT  EMERGENCY DEPARTMENT ENCOUNTER      Pt Name: Susana Palma  MRN:[id number]  Birthdate 1950  Date of evaluation: 5/2/2025  Provider: Carlo Cross PA-C  Note Started: 2:55 PM EDT 5/2/25        Chief Complaint:    Chief Complaint   Patient presents with    Abdominal Pain     Pt states that she has been experiencing mid to right sided upper abdominal pain, nausea, vomiting, and diarrhea since last night. Pt states that she has hx of pancreatitis. Pt denies fever/chills. Pt states that PCP told her to go to ED. Pt AAO x 4. VSS.          Nursing Notes, Past Medical Hx, Past Surgical Hx, Social Hx, Allergies, and Family Hx were all reviewed and agreed with or any disagreements were addressed in the HPI.    HPI: (Location, Duration, Timing, Severity, Quality, Assoc Sx, Context, Modifying factors)    History From: Patient  Limitations to history : None    Social Determinants Significantly Affecting Health : None    Chief Complaint of mid to upper abdominal pain.  Says started last night.  Associated with some nausea and diarrhea.  Also vomiting.  Denies fever.  Denies blood or stool.  Denies coughing up any blood or coffee-ground material.  Pain does radiate to her back.  Has history of pancreatitis.  She denies any alcohol use recently.  But last week she says she had a cooler type drink.  No chest pain, no lightheaded or dizziness.  No headaches.  No other complaints.  She is ambulatory.    This is a  74 y.o. female who presents to the emergency room with the above complaint    PastMedical/Surgical History:      Diagnosis Date    Anemia     Arthritis     osteoporesis    Blood in stool     Chronic pancreatitis (HCC)     GERD (gastroesophageal reflux disease)     Hypertension     Migraines     Osteopetrosis     Postmenopausal     Raynaud disease     Rheumatoid arthritis(714.0)     rheumatoid arthritis

## 2025-05-02 NOTE — ED NOTES
ED TO INPATIENT SBAR HANDOFF    Patient Name: Susana Palma   Preferred Name: Susana  : 1950  74 y.o.   Family/Caregiver Present: no   Code Status Order: Prior  PO Status: NPO:Yes exceptions are ice chips, sips w/ meds  Telemetry Order: No  C-SSRS: Risk of Suicide: No Risk  Sitter no   Restraints:     Sepsis Risk Score      Situation  Chief Complaint   Patient presents with    Abdominal Pain     Pt states that she has been experiencing mid to right sided upper abdominal pain, nausea, vomiting, and diarrhea since last night. Pt states that she has hx of pancreatitis. Pt denies fever/chills. Pt states that PCP told her to go to ED. Pt AAO x 4. VSS.      Brief Description of Patient's Condition: Pt resting in bed, no complaints at this time. Ambulatory w/o difficulty  Mental Status: oriented, alert, coherent, and logical  Arrived from:Home  Imaging:   CT ABDOMEN PELVIS W IV CONTRAST Additional Contrast? None   Final Result   1. Small bowel obstruction with transition point in the pelvis.   2. Small amount of layering fluid in the pelvis and cul-de-sac, likely   reactive.   3. Stable loss of body height at L2 and T12. Progressive loss of body height   at L1 and T11.           Abnormal labs:   Abnormal Labs Reviewed   CBC WITH AUTO DIFFERENTIAL - Abnormal; Notable for the following components:       Result Value    WBC 12.4 (*)     Neutrophils Absolute 10.5 (*)     Lymphocytes Absolute 0.8 (*)     All other components within normal limits   COMPREHENSIVE METABOLIC PANEL - Abnormal; Notable for the following components:    Glucose 104 (*)     Alkaline Phosphatase 132 (*)     All other components within normal limits       Background  Allergies:   Allergies   Allergen Reactions    Sulfamethoxazole-Trimethoprim Nausea Only    Adhesive Tape      Tape    Magnesium Diarrhea    Sulfa Antibiotics     Venofer [Iron Sucrose]     Demerol Hcl [Meperidine] Nausea And Vomiting    Erythromycin Diarrhea and Nausea And

## 2025-05-02 NOTE — CONSULTS
Surgery Consult Note     Kvng Dinh PA-C  Pt Name: Susana Palma  MRN: [id number]  YOB: 1950  Date of evaluation: 5/2/2025  Primary Care Physician: Sadia Gonzalez MD  Referred By: Carlo Cross  Reason for Consultation: Consult for small bowel obstruction.   Chief Complaint:Abdominal pain  IMPRESSIONS:   SBO  WBC count: 12.4  +diarrhea  Hx of hysterectomy and cholecystomy.  PLANS:   Monitor and control any pain  If nausea/emesis persist may need to place an NGT.   Will monitor for symptoms to improve. If symptoms continue or worsen may need further imaging vs exploration  SUBJECTIVE:   History of Chief Complaint:    Susana Palma is a 74 y.o. female who presents with abdominal pain. She stated the her pain began yesterday and is located in the upper abdomen and radiates to her back. She had a CT scan performed and that showed a small bowel obstruction with transition point in the pelvis. She admits to having associated nausea, emesis and diarrhea. She denies having any other pain. She has had a hysterectomy and cholecystectomy in the past.   Past Medical History  Reviewed  has a past medical history of Anemia, Arthritis, Blood in stool, Chronic pancreatitis (HCC), GERD (gastroesophageal reflux disease), Hypertension, Migraines, Osteopetrosis, Postmenopausal, Raynaud disease, Rheumatoid arthritis(714.0), Rib fracture, and Vertigo.  Past Surgical History  Reviewed has a past surgical history that includes parathyroidectomy (Bilateral, 2006); Cholecystectomy; Hysterectomy; Tonsillectomy; fracture surgery (Left, 1998); Endoscopy, colon, diagnostic; Endoscopy, colon, diagnostic (07/25/2014); Colonoscopy; bone marrow biopsy; knee surgery; Pain management procedure (Bilateral, 2/11/2022); and Pain management procedure (Bilateral, 2/25/2022).  Medications  Prior to Admission medications    Medication Sig Start Date End Date Taking? Authorizing Provider   dilTIAZem (CARDIZEM CD) 120 MG extended

## 2025-05-02 NOTE — PROGRESS NOTES
Medication Reconciliation    List of medications patient is currently taking is complete.     Source of information: 1. Conversation with patient at bedside                                      2. EPIC records         Notes regarding home medications:   1. Patient reports she attempted to take medications this morning but ended up vomiting soon after.      Carlos Fernandez, [facility]   5/2/2025  3:58 PM

## 2025-05-03 ENCOUNTER — APPOINTMENT (OUTPATIENT)
Dept: GENERAL RADIOLOGY | Age: 75
DRG: 389 | End: 2025-05-03
Payer: MEDICARE

## 2025-05-03 LAB
ALBUMIN SERPL-MCNC: 3.3 G/DL (ref 3.4–5)
ALBUMIN/GLOB SERPL: 1.8 {RATIO} (ref 1.1–2.2)
ALP SERPL-CCNC: 87 U/L (ref 40–129)
ALT SERPL-CCNC: 8 U/L (ref 10–40)
ANION GAP SERPL CALCULATED.3IONS-SCNC: 7 MMOL/L (ref 3–16)
AST SERPL-CCNC: 23 U/L (ref 15–37)
BILIRUB SERPL-MCNC: 0.6 MG/DL (ref 0–1)
BUN SERPL-MCNC: 14 MG/DL (ref 7–20)
CALCIUM SERPL-MCNC: 8 MG/DL (ref 8.3–10.6)
CHLORIDE SERPL-SCNC: 110 MMOL/L (ref 99–110)
CO2 SERPL-SCNC: 21 MMOL/L (ref 21–32)
CREAT SERPL-MCNC: 0.5 MG/DL (ref 0.6–1.2)
DEPRECATED RDW RBC AUTO: 14.3 % (ref 12.4–15.4)
GFR SERPLBLD CREATININE-BSD FMLA CKD-EPI: >90 ML/MIN/{1.73_M2}
GLUCOSE SERPL-MCNC: 90 MG/DL (ref 70–99)
HCT VFR BLD AUTO: 32.6 % (ref 36–48)
HGB BLD-MCNC: 11.2 G/DL (ref 12–16)
MAGNESIUM SERPL-MCNC: 1.59 MG/DL (ref 1.8–2.4)
MCH RBC QN AUTO: 31.2 PG (ref 26–34)
MCHC RBC AUTO-ENTMCNC: 34.2 G/DL (ref 31–36)
MCV RBC AUTO: 91.2 FL (ref 80–100)
PLATELET # BLD AUTO: 247 K/UL (ref 135–450)
PMV BLD AUTO: 7.9 FL (ref 5–10.5)
POTASSIUM SERPL-SCNC: 4.1 MMOL/L (ref 3.5–5.1)
PROT SERPL-MCNC: 5.1 G/DL (ref 6.4–8.2)
RBC # BLD AUTO: 3.57 M/UL (ref 4–5.2)
SODIUM SERPL-SCNC: 138 MMOL/L (ref 136–145)
WBC # BLD AUTO: 6.7 K/UL (ref 4–11)

## 2025-05-03 PROCEDURE — 94760 N-INVAS EAR/PLS OXIMETRY 1: CPT

## 2025-05-03 PROCEDURE — 36415 COLL VENOUS BLD VENIPUNCTURE: CPT

## 2025-05-03 PROCEDURE — 83735 ASSAY OF MAGNESIUM: CPT

## 2025-05-03 PROCEDURE — 85027 COMPLETE CBC AUTOMATED: CPT

## 2025-05-03 PROCEDURE — 1200000000 HC SEMI PRIVATE

## 2025-05-03 PROCEDURE — 74019 RADEX ABDOMEN 2 VIEWS: CPT

## 2025-05-03 PROCEDURE — 6360000002 HC RX W HCPCS: Performed by: FAMILY MEDICINE

## 2025-05-03 PROCEDURE — 6370000000 HC RX 637 (ALT 250 FOR IP): Performed by: FAMILY MEDICINE

## 2025-05-03 PROCEDURE — 2580000003 HC RX 258: Performed by: FAMILY MEDICINE

## 2025-05-03 PROCEDURE — 80053 COMPREHEN METABOLIC PANEL: CPT

## 2025-05-03 RX ADMIN — ACETAMINOPHEN 650 MG: 325 TABLET ORAL at 12:19

## 2025-05-03 RX ADMIN — ACETAMINOPHEN 650 MG: 325 TABLET ORAL at 21:51

## 2025-05-03 RX ADMIN — DILTIAZEM HYDROCHLORIDE 240 MG: 240 CAPSULE, EXTENDED RELEASE ORAL at 09:15

## 2025-05-03 RX ADMIN — SODIUM CHLORIDE: 0.9 INJECTION, SOLUTION INTRAVENOUS at 09:16

## 2025-05-03 RX ADMIN — LISINOPRIL 40 MG: 40 TABLET ORAL at 09:15

## 2025-05-03 RX ADMIN — ENOXAPARIN SODIUM 30 MG: 100 INJECTION SUBCUTANEOUS at 21:48

## 2025-05-03 RX ADMIN — MAGNESIUM SULFATE HEPTAHYDRATE 2000 MG: 40 INJECTION, SOLUTION INTRAVENOUS at 12:22

## 2025-05-03 ASSESSMENT — PAIN DESCRIPTION - LOCATION: LOCATION: HEAD

## 2025-05-03 ASSESSMENT — PAIN SCALES - GENERAL
PAINLEVEL_OUTOF10: 7
PAINLEVEL_OUTOF10: 0
PAINLEVEL_OUTOF10: 6

## 2025-05-03 NOTE — PLAN OF CARE
Problem: Discharge Planning  Goal: Discharge to home or other facility with appropriate resources  Outcome: Progressing     Problem: Safety - Adult  Goal: Free from fall injury  Outcome: Progressing  Flowsheets (Taken 5/2/2025 2311)  Free From Fall Injury: Instruct family/caregiver on patient safety     Problem: ABCDS Injury Assessment  Goal: Absence of physical injury  Outcome: Progressing  Flowsheets (Taken 5/2/2025 2311)  Absence of Physical Injury: Implement safety measures based on patient assessment

## 2025-05-03 NOTE — PROGRESS NOTES
Hospitalist Progress Note      PCP: Sadia Gonzalez MD    Date of Admission: 5/2/2025    Chief Complaint: Abdominal pain with nausea, vomiting, diarrhea    Hospital Course: 74-year-old female with interstitial lung disease, PAF on Eliquis, essential hypertension who presented to the ED with  complaints of abdominal pain, nausea and vomiting.  While in the emergency room, she was mildly tachycardic but otherwise hemodynamically stable.  CBC was notable for a mild leukocytosis, BMP was unremarkable.  CT abdomen/pelvis showed a small bowel obstruction with transition point in the pelvis.  General surgery was consulted from ED, recommended keep n.p.o. for now with reevaluation in the a.m.  Patient's nausea/vomiting resolved, NG tube was not placed.  She was started on IV fluids and admitted for further workup/treatment    Repeat KUB done 5/3 shows a nonobstructive bowel gas pattern.  General surgery evaluated bedside, currently on full liquid diet with plan to advance as tolerated.    Subjective: Patient seen resting in bed, states she is starving, having no abdominal pain or nausea or vomiting.  Did have a bowel movement after she returned from KUB.  Discussed trialing liquids as per general surgery recommendation.    Assessment/Plan:    Small bowel obstruction  -CT A/P showed small bowel obstruction with transition point in the pelvis  -Initially kept n.p.o., no NG tube was placed  -General Surgery consulted, repeat KUB showing resolution.  Currently advancing to full liquids  -Monitor for symptom return with diet    Hypomagnesia  - Mag 1.59.  Will replete per K/mag protocol.  Recheck mag in a.m.    PAF  -Heart rate currently controlled in sinus rhythm.  Continue Cardizem 40 daily.  Holding Eliquis in anticipation of possible surgery, will resume in a.m.  Continue Lovenox for DVT Ppx    Essential hypertension  -BP control.  Continue home lisinopril    Interstitial lung disease  -Currently stable on room air.

## 2025-05-03 NOTE — PROGRESS NOTES
Progress Note  Date:5/3/2025       Room:Q0V-5757/3126-01  Patient Name:Susana Palma     YOB: 1950     Age:74 y.o.        Subjective    Subjective:  Symptoms:  Improved.    Diet:  NPO.  No nausea or vomiting.    Activity level: Returning to normal.    Pain:  She reports no pain.       Review of Systems   Gastrointestinal:  Negative for nausea and vomiting.     Objective         Vitals Last 24 Hours:  TEMPERATURE:  Temp  Av.3 °F (36.8 °C)  Min: 97.7 °F (36.5 °C)  Max: 98.6 °F (37 °C)  RESPIRATIONS RANGE: Resp  Av.9  Min: 16  Max: 26  PULSE OXIMETRY RANGE: SpO2  Av.9 %  Min: 90 %  Max: 100 %  PULSE RANGE: Pulse  Av.7  Min: 54  Max: 115  BLOOD PRESSURE RANGE: Systolic (24hrs), Av , Min:112 , Max:152   ; Diastolic (24hrs), Av, Min:60, Max:133    I/O (24Hr):    Intake/Output Summary (Last 24 hours) at 5/3/2025 0952  Last data filed at 2025  Gross per 24 hour   Intake 120 ml   Output --   Net 120 ml     Objective  Labs/Imaging/Diagnostics    Labs:  CBC:  Recent Labs     25  1118 25  0540   WBC 12.4* 6.7   RBC 4.82 3.57*   HGB 14.6 11.2*   HCT 43.7 32.6*   MCV 90.5 91.2   RDW 14.3 14.3    247     CHEMISTRIES:  Recent Labs     25  1118 25  1652 25  0540     --  138   K 4.4  --  4.1     --  110   CO2 21  --  21   BUN 17  --  14   CREATININE 0.9  --  0.5*   GLUCOSE 104*  --  90   MG  --  1.61* 1.59*     PT/INR:No results for input(s): \"PROTIME\", \"INR\" in the last 72 hours.  APTT:No results for input(s): \"APTT\" in the last 72 hours.  LIVER PROFILE:  Recent Labs     25  1118 25  0540   AST 36 23   ALT 13 8*   BILITOT 0.8 0.6   ALKPHOS 132* 87       Imaging Last 24 Hours:  CT ABDOMEN PELVIS W IV CONTRAST Additional Contrast? None  Result Date: 2025  EXAMINATION: CT OF THE ABDOMEN AND PELVIS WITH CONTRAST 2025 12:05 pm TECHNIQUE: CT of the abdomen and pelvis was performed with the administration of

## 2025-05-04 VITALS
WEIGHT: 106.92 LBS | SYSTOLIC BLOOD PRESSURE: 169 MMHG | DIASTOLIC BLOOD PRESSURE: 102 MMHG | OXYGEN SATURATION: 96 % | BODY MASS INDEX: 20.99 KG/M2 | HEIGHT: 60 IN | TEMPERATURE: 98.6 F | HEART RATE: 81 BPM | RESPIRATION RATE: 16 BRPM

## 2025-05-04 LAB
ALBUMIN SERPL-MCNC: 3.7 G/DL (ref 3.4–5)
ALBUMIN/GLOB SERPL: 1.7 {RATIO} (ref 1.1–2.2)
ALP SERPL-CCNC: 98 U/L (ref 40–129)
ALT SERPL-CCNC: 9 U/L (ref 10–40)
ANION GAP SERPL CALCULATED.3IONS-SCNC: 11 MMOL/L (ref 3–16)
AST SERPL-CCNC: 30 U/L (ref 15–37)
BILIRUB SERPL-MCNC: 0.3 MG/DL (ref 0–1)
BUN SERPL-MCNC: 5 MG/DL (ref 7–20)
CALCIUM SERPL-MCNC: 8.1 MG/DL (ref 8.3–10.6)
CHLORIDE SERPL-SCNC: 109 MMOL/L (ref 99–110)
CO2 SERPL-SCNC: 21 MMOL/L (ref 21–32)
CREAT SERPL-MCNC: 0.5 MG/DL (ref 0.6–1.2)
DEPRECATED RDW RBC AUTO: 14.2 % (ref 12.4–15.4)
GFR SERPLBLD CREATININE-BSD FMLA CKD-EPI: >90 ML/MIN/{1.73_M2}
GLUCOSE SERPL-MCNC: 117 MG/DL (ref 70–99)
HCT VFR BLD AUTO: 36.9 % (ref 36–48)
HGB BLD-MCNC: 12.1 G/DL (ref 12–16)
MCH RBC QN AUTO: 30.3 PG (ref 26–34)
MCHC RBC AUTO-ENTMCNC: 32.9 G/DL (ref 31–36)
MCV RBC AUTO: 92.2 FL (ref 80–100)
PLATELET # BLD AUTO: 287 K/UL (ref 135–450)
PMV BLD AUTO: 8.1 FL (ref 5–10.5)
POTASSIUM SERPL-SCNC: 3.2 MMOL/L (ref 3.5–5.1)
PROT SERPL-MCNC: 5.9 G/DL (ref 6.4–8.2)
RBC # BLD AUTO: 4.01 M/UL (ref 4–5.2)
SODIUM SERPL-SCNC: 141 MMOL/L (ref 136–145)
WBC # BLD AUTO: 7.2 K/UL (ref 4–11)

## 2025-05-04 PROCEDURE — 36415 COLL VENOUS BLD VENIPUNCTURE: CPT

## 2025-05-04 PROCEDURE — 80053 COMPREHEN METABOLIC PANEL: CPT

## 2025-05-04 PROCEDURE — 6370000000 HC RX 637 (ALT 250 FOR IP)

## 2025-05-04 PROCEDURE — 85027 COMPLETE CBC AUTOMATED: CPT

## 2025-05-04 PROCEDURE — 94760 N-INVAS EAR/PLS OXIMETRY 1: CPT

## 2025-05-04 PROCEDURE — 6370000000 HC RX 637 (ALT 250 FOR IP): Performed by: FAMILY MEDICINE

## 2025-05-04 RX ORDER — ONDANSETRON 4 MG/1
4 TABLET, ORALLY DISINTEGRATING ORAL EVERY 8 HOURS PRN
Qty: 21 TABLET | Refills: 0 | Status: SHIPPED | OUTPATIENT
Start: 2025-05-04 | End: 2025-05-11

## 2025-05-04 RX ORDER — LOPERAMIDE HYDROCHLORIDE 2 MG/1
2 CAPSULE ORAL 4 TIMES DAILY PRN
Status: DISCONTINUED | OUTPATIENT
Start: 2025-05-04 | End: 2025-05-04 | Stop reason: HOSPADM

## 2025-05-04 RX ADMIN — POTASSIUM CHLORIDE 40 MEQ: 1500 TABLET, EXTENDED RELEASE ORAL at 09:27

## 2025-05-04 RX ADMIN — PANTOPRAZOLE SODIUM 40 MG: 40 TABLET, DELAYED RELEASE ORAL at 05:30

## 2025-05-04 RX ADMIN — ACETAMINOPHEN 650 MG: 325 TABLET ORAL at 09:33

## 2025-05-04 RX ADMIN — LISINOPRIL 40 MG: 40 TABLET ORAL at 09:27

## 2025-05-04 RX ADMIN — LOPERAMIDE HYDROCHLORIDE 2 MG: 2 CAPSULE ORAL at 11:05

## 2025-05-04 RX ADMIN — DILTIAZEM HYDROCHLORIDE 240 MG: 240 CAPSULE, EXTENDED RELEASE ORAL at 09:27

## 2025-05-04 ASSESSMENT — PAIN SCALES - GENERAL
PAINLEVEL_OUTOF10: 6
PAINLEVEL_OUTOF10: 0

## 2025-05-04 ASSESSMENT — PAIN DESCRIPTION - LOCATION: LOCATION: GENERALIZED;HEAD

## 2025-05-04 NOTE — PROGRESS NOTES
Progress Note  Date:2025       Room:S3H-2618/3126-01  Patient Name:Susana Palma     YOB: 1950     Age:74 y.o.        Subjective    Subjective:  Symptoms:  Improved.    Diet:  Adequate intake.  No nausea or vomiting.    Activity level: Returning to normal.    Pain:  She reports no pain.       Review of Systems   Gastrointestinal:  Negative for nausea and vomiting.     Objective         Vitals Last 24 Hours:  TEMPERATURE:  Temp  Av.1 °F (36.7 °C)  Min: 97.7 °F (36.5 °C)  Max: 98.6 °F (37 °C)  RESPIRATIONS RANGE: Resp  Av  Min: 16  Max: 16  PULSE OXIMETRY RANGE: SpO2  Av %  Min: 95 %  Max: 98 %  PULSE RANGE: Pulse  Av  Min: 75  Max: 109  BLOOD PRESSURE RANGE: Systolic (24hrs), Av , Min:152 , Max:169   ; Diastolic (24hrs), Av, Min:60, Max:102    I/O (24Hr):    Intake/Output Summary (Last 24 hours) at 2025 0942  Last data filed at 2025 0926  Gross per 24 hour   Intake 610 ml   Output --   Net 610 ml     Objective  Labs/Imaging/Diagnostics    Labs:  CBC:  Recent Labs     25  1118 25  0540 25  0359   WBC 12.4* 6.7 7.2   RBC 4.82 3.57* 4.01   HGB 14.6 11.2* 12.1   HCT 43.7 32.6* 36.9   MCV 90.5 91.2 92.2   RDW 14.3 14.3 14.2    247 287     CHEMISTRIES:  Recent Labs     25  1118 25  1652 25  0540 25  0359     --  138 141   K 4.4  --  4.1 3.2*     --  110 109   CO2 21  --  21 21   BUN 17  --  14 5*   CREATININE 0.9  --  0.5* 0.5*   GLUCOSE 104*  --  90 117*   MG  --  1.61* 1.59*  --      PT/INR:No results for input(s): \"PROTIME\", \"INR\" in the last 72 hours.  APTT:No results for input(s): \"APTT\" in the last 72 hours.  LIVER PROFILE:  Recent Labs     25  1118 25  0540 25  0359   AST 36 23 30   ALT 13 8* 9*   BILITOT 0.8 0.6 0.3   ALKPHOS 132* 87 98       Imaging Last 24 Hours:  XR ABDOMEN (2 VIEWS)  Result Date: 5/3/2025  EXAMINATION: TWO XRAY VIEWS OF THE ABDOMEN 5/3/2025 8:42 am COMPARISON:

## 2025-05-04 NOTE — DISCHARGE SUMMARY
Hospital Medicine Discharge Summary    Patient ID: Susana Palma      Patient's PCP: Sadia Gonzalez MD    Admit Date: 5/2/2025     Discharge Date:   5/4/25    Admitting Physician: Zeynep Ramsey MD     Discharge Physician: Nguyen Curiel PA-C         Hospital Course: 74-year-old female with interstitial lung disease, PAF on Eliquis, essential hypertension who presented to the ED with  complaints of abdominal pain, nausea and vomiting.  While in the emergency room, she was mildly tachycardic but otherwise hemodynamically stable.  CBC was notable for a mild leukocytosis, BMP was unremarkable.  CT abdomen/pelvis showed a small bowel obstruction with transition point in the pelvis.  General surgery was consulted from ED, recommended keep n.p.o. for now with reevaluation in the a.m.  Patient's nausea/vomiting resolved, NG tube was not placed.  She was started on IV fluids and admitted for further workup/treatment     Repeat KUB done 5/3 showed resolution of SBO. General surgery evaluated bedside, recommended no surgical intervention and  advancing diet as tolerated. Patient was able to tolerate a regular diet without nausea, vomiting or pain prior to d/c. Patient stated she felt well and wanted to d/c home. Return precautions such as worsening abdominal pain, N/V were given and she verbalized understanding.       Small bowel obstruction  -CT A/P showed small bowel obstruction with transition point in the pelvis  -Initially kept n.p.o., no NG tube was placed  -General Surgery consulted, repeat KUB showing resolution.  Tolerated full liquids, and advanced to regular prior to d/c.   - Currently no abdominal pain, nausea or vomiting     Hypomagnesia  - Repleted with 2 g magnesium     PAF  -Heart rate currently controlled in sinus rhythm.  Continue Cardizem 40 daily.  Resume Eliquis.     Essential hypertension  -BP control.  Continue home lisinopril     Interstitial lung disease  -Currently stable on room air.

## 2025-05-04 NOTE — PLAN OF CARE
Problem: Discharge Planning  Goal: Discharge to home or other facility with appropriate resources  5/3/2025 2237 by Radha Carney RN  Outcome: Progressing  5/3/2025 2237 by Radha Carney RN  Outcome: Progressing  5/3/2025 1108 by Tiffanie Duncan RN  Outcome: Progressing  Flowsheets (Taken 5/3/2025 0800)  Discharge to home or other facility with appropriate resources:   Identify barriers to discharge with patient and caregiver   Arrange for needed discharge resources and transportation as appropriate   Identify discharge learning needs (meds, wound care, etc)   Arrange for interpreters to assist at discharge as needed   Refer to discharge planning if patient needs post-hospital services based on physician order or complex needs related to functional status, cognitive ability or social support system     Problem: Safety - Adult  Goal: Free from fall injury  5/3/2025 2237 by Radha Carney RN  Outcome: Progressing  Flowsheets (Taken 5/3/2025 2237)  Free From Fall Injury: Instruct family/caregiver on patient safety  5/3/2025 1108 by Tiffanie Duncan RN  Outcome: Progressing  Flowsheets (Taken 5/3/2025 1106)  Free From Fall Injury:   Instruct family/caregiver on patient safety   Based on caregiver fall risk screen, instruct family/caregiver to ask for assistance with transferring infant if caregiver noted to have fall risk factors     Problem: ABCDS Injury Assessment  Goal: Absence of physical injury  Outcome: Progressing  Flowsheets  Taken 5/3/2025 2237 by Radha Carney RN  Absence of Physical Injury: Implement safety measures based on patient assessment  Taken 5/3/2025 1106 by Tiffanie Duncan RN  Absence of Physical Injury: Implement safety measures based on patient assessment     Problem: Pain  Goal: Verbalizes/displays adequate comfort level or baseline comfort level  Outcome: Progressing
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
## 2025-05-04 NOTE — PROGRESS NOTES
Data- discharge order received, pt verbalized agreement to discharge, disposition to previous residence, no needs for HHC/DME.     Action- discharge instructions prepared and given to pt. , pt verbalized understanding. Medication information packet given r/t NEW and/or CHANGED prescriptions emphasizing name/purpose/side effects, pt verbalized understanding. Discharge instruction summary: Diet- regular low residue , Activity- UAL , Primary Care Physician as follows: Sadia Gonzalez [phone] f/u appointment advised to make in one week , immunizations reviewed and up to date , prescription medications filled at [facility]  retail pharmacy . Inpatient treatment reviewed.     Response- Pt belongings gathered, IV removed. Disposition is home (no HHC/DME needs), transported with PCA , taken to lobby via w/c w/ PCA , no complications. Pt.'s  picked her up at the front door.

## 2025-05-04 NOTE — PROGRESS NOTES
Hospitalist Progress Note      PCP: Sadia Gonzalez MD    Date of Admission: 5/2/2025    Chief Complaint: Abdominal pain with nausea, vomiting, diarrhea    Hospital Course: 74-year-old female with interstitial lung disease, PAF on Eliquis, essential hypertension who presented to the ED with  complaints of abdominal pain, nausea and vomiting.  While in the emergency room, she was mildly tachycardic but otherwise hemodynamically stable.  CBC was notable for a mild leukocytosis, BMP was unremarkable.  CT abdomen/pelvis showed a small bowel obstruction with transition point in the pelvis.  General surgery was consulted from ED, recommended keep n.p.o. for now with reevaluation in the a.m.  Patient's nausea/vomiting resolved, NG tube was not placed.  She was started on IV fluids and admitted for further workup/treatment    Repeat KUB done 5/3 shows a nonobstructive bowel gas pattern.  General surgery evaluated bedside, currently on full liquid diet with plan to advance as tolerated.    Subjective: Patient seen resting in bed, tolerated liquids fine.  Discussed advancing to regular today.  Patient also having diarrhea, says she thinks is from the magnesium.  Discussed adding Imodium and as needed.  Plan to discharge later today versus a.m. pending general surgery eval    Assessment/Plan:    Small bowel obstruction  -CT A/P showed small bowel obstruction with transition point in the pelvis  -Initially kept n.p.o., no NG tube was placed  -General Surgery consulted, repeat KUB showing resolution.  Tolerated full liquids, advance to regular this a.m.  - Currently no abdominal pain, nausea or vomiting    Hypomagnesia  - Repleted with 2 g magnesium    PAF  -Heart rate currently controlled in sinus rhythm.  Continue Cardizem 40 daily.  Resume Eliquis.    Essential hypertension  -BP control.  Continue home lisinopril    Interstitial lung disease  -Currently stable on room air.  Follows with [facility] pulmonology  Active

## 2025-05-09 NOTE — PROGRESS NOTES
Physician Progress Note      PATIENT:               AMIRAH HAYES  CSN #:                  [id number]  :                       1950  ADMIT DATE:       2025 10:28 AM  DISCH DATE:        2025 1:02 PM  RESPONDING  PROVIDER #:        Victor Hugo Richard MD          QUERY TEXT:    Small Bowel obstruction is documented in the medical record (DS ). Please   clarify the cause of the bowel obstruction:    The clinical indicators include:  -\"Partial SBO; Dilated small bowel pelvis. Likely adhesions with partial   obstruction\" (Consult )  -CT A/P showed small bowel obstruction with transition point in the pelvis  -\"NG tube if patient started decompensating or starts to have vomiting or   discomfort. IV fluid resuscitation\" (H&P )  -\"Resolving SBO\" (Dr Hall PN )  -\"Repeat KUB done 5/3 shows a nonobstructive bowel gas pattern.\" (Dr West   5/3)  -\"Tolerating PO, having BMs which are loose\" (Dr Hall PN )  Options provided:  -- Partial SBO related to intestinal adhesions  -- Other - I will add my own diagnosis  -- Disagree - Not applicable / Not valid  -- Disagree - Clinically unable to determine / Unknown  -- Refer to Clinical Documentation Reviewer    PROVIDER RESPONSE TEXT:    This patient has partial SBO related to intestinal adhesions.    Query created by: Emil Presley on 2025 7:31 PM      QUERY TEXT:    Based on your medical judgment, please clarify these findings and document if   any of the following are being evaluated and/or treated:    The clinical indicators include:  -\"74 y.o. female\" (H&P )    -\"Essential hypertension. Lisinopril 40 mg daily\" (H&P )    -\"Paroxysmal A-fib. Currently on Eliquis 5 mg twice daily will hold just in   case for surgical intervention\" (H&P )    -\"Continue Cardizem 40 daily.  Holding Eliquis in anticipation of possible   surgery, will resume in a.m.  Continue Lovenox for DVT Ppx\" (PN 5/3)    -\"PAF Heart rate currently controlled in

## 2025-07-09 ENCOUNTER — TELEPHONE (OUTPATIENT)
Dept: UROGYNECOLOGY | Age: 75
End: 2025-07-09

## 2025-07-09 NOTE — TELEPHONE ENCOUNTER
Patient calling in about her estrogen cream. As we have not seen her since 2023, and she reports no issues, informed her that she may request this med be filled by PCP - she is grateful for our care and has no other questions or concerns at this time. She will ask PCP for estrogen cream.  7/9/2025 at 10:07 AM

## 2025-08-19 ENCOUNTER — HOSPITAL ENCOUNTER (EMERGENCY)
Age: 75
Discharge: HOME | End: 2025-08-19
Payer: MEDICARE

## 2025-08-19 ENCOUNTER — APPOINTMENT (OUTPATIENT)
Dept: CT IMAGING | Age: 75
End: 2025-08-19
Payer: MEDICARE

## 2025-08-19 VITALS
TEMPERATURE: 98.1 F | DIASTOLIC BLOOD PRESSURE: 78 MMHG | OXYGEN SATURATION: 97 % | RESPIRATION RATE: 18 BRPM | HEART RATE: 85 BPM | SYSTOLIC BLOOD PRESSURE: 146 MMHG

## 2025-08-19 DIAGNOSIS — K44.9 HIATAL HERNIA: ICD-10-CM

## 2025-08-19 DIAGNOSIS — R10.31 ABDOMINAL PAIN, RIGHT LOWER QUADRANT: Primary | ICD-10-CM

## 2025-08-19 DIAGNOSIS — R93.5 ABNORMAL CT OF THE ABDOMEN: ICD-10-CM

## 2025-08-19 LAB
ALBUMIN SERPL-MCNC: 4.6 G/DL (ref 3.4–5)
ALBUMIN/GLOB SERPL: 1.6 {RATIO} (ref 1.1–2.2)
ALP SERPL-CCNC: 100 U/L (ref 40–129)
ALT SERPL-CCNC: 15 U/L (ref 10–40)
ANION GAP SERPL CALCULATED.3IONS-SCNC: 15 MMOL/L (ref 3–16)
AST SERPL-CCNC: 36 U/L (ref 15–37)
BASOPHILS # BLD: 0 K/UL (ref 0–0.2)
BASOPHILS NFR BLD: 0.6 %
BILIRUB SERPL-MCNC: 0.4 MG/DL (ref 0–1)
BILIRUB UR QL STRIP.AUTO: NEGATIVE
BUN SERPL-MCNC: 18 MG/DL (ref 7–20)
CALCIUM SERPL-MCNC: 9.9 MG/DL (ref 8.3–10.6)
CHLORIDE SERPL-SCNC: 96 MMOL/L (ref 99–110)
CLARITY UR: CLEAR
CO2 SERPL-SCNC: 21 MMOL/L (ref 21–32)
COLOR UR: YELLOW
CREAT SERPL-MCNC: 0.6 MG/DL (ref 0.6–1.2)
DEPRECATED RDW RBC AUTO: 15.6 % (ref 12.4–15.4)
EOSINOPHIL # BLD: 0.1 K/UL (ref 0–0.6)
EOSINOPHIL NFR BLD: 2.3 %
GFR SERPLBLD CREATININE-BSD FMLA CKD-EPI: >90 ML/MIN/{1.73_M2}
GLUCOSE SERPL-MCNC: 110 MG/DL (ref 70–99)
GLUCOSE UR STRIP.AUTO-MCNC: NEGATIVE MG/DL
HCT VFR BLD AUTO: 42.6 % (ref 36–48)
HGB BLD-MCNC: 14.1 G/DL (ref 12–16)
HGB UR QL STRIP.AUTO: NEGATIVE
KETONES UR STRIP.AUTO-MCNC: NEGATIVE MG/DL
LACTATE BLDV-SCNC: 1.4 MMOL/L (ref 0.4–2)
LEUKOCYTE ESTERASE UR QL STRIP.AUTO: NEGATIVE
LIPASE SERPL-CCNC: 65 U/L (ref 13–60)
LYMPHOCYTES # BLD: 1.2 K/UL (ref 1–5.1)
LYMPHOCYTES NFR BLD: 18.4 %
MCH RBC QN AUTO: 31 PG (ref 26–34)
MCHC RBC AUTO-ENTMCNC: 33 G/DL (ref 31–36)
MCV RBC AUTO: 94 FL (ref 80–100)
MONOCYTES # BLD: 0.8 K/UL (ref 0–1.3)
MONOCYTES NFR BLD: 12.4 %
NEUTROPHILS # BLD: 4.3 K/UL (ref 1.7–7.7)
NEUTROPHILS NFR BLD: 66.3 %
NITRITE UR QL STRIP.AUTO: NEGATIVE
PH UR STRIP.AUTO: 6 [PH] (ref 5–8)
PLATELET # BLD AUTO: 271 K/UL (ref 135–450)
PMV BLD AUTO: 8.1 FL (ref 5–10.5)
POTASSIUM SERPL-SCNC: 4.1 MMOL/L (ref 3.5–5.1)
PROT SERPL-MCNC: 7.5 G/DL (ref 6.4–8.2)
PROT UR STRIP.AUTO-MCNC: NEGATIVE MG/DL
RBC # BLD AUTO: 4.53 M/UL (ref 4–5.2)
SODIUM SERPL-SCNC: 132 MMOL/L (ref 136–145)
SP GR UR STRIP.AUTO: 1.01 (ref 1–1.03)
UA COMPLETE W REFLEX CULTURE PNL UR: NORMAL
UA DIPSTICK W REFLEX MICRO PNL UR: NORMAL
URN SPEC COLLECT METH UR: NORMAL
UROBILINOGEN UR STRIP-ACNC: 0.2 E.U./DL
WBC # BLD AUTO: 6.5 K/UL (ref 4–11)

## 2025-08-19 PROCEDURE — 83605 ASSAY OF LACTIC ACID: CPT

## 2025-08-19 PROCEDURE — 83690 ASSAY OF LIPASE: CPT

## 2025-08-19 PROCEDURE — 99285 EMERGENCY DEPT VISIT HI MDM: CPT

## 2025-08-19 PROCEDURE — 85025 COMPLETE CBC W/AUTO DIFF WBC: CPT

## 2025-08-19 PROCEDURE — 6370000000 HC RX 637 (ALT 250 FOR IP)

## 2025-08-19 PROCEDURE — 96360 HYDRATION IV INFUSION INIT: CPT

## 2025-08-19 PROCEDURE — 6360000004 HC RX CONTRAST MEDICATION

## 2025-08-19 PROCEDURE — 74177 CT ABD & PELVIS W/CONTRAST: CPT

## 2025-08-19 PROCEDURE — 80053 COMPREHEN METABOLIC PANEL: CPT

## 2025-08-19 PROCEDURE — 81003 URINALYSIS AUTO W/O SCOPE: CPT

## 2025-08-19 PROCEDURE — 2580000003 HC RX 258

## 2025-08-19 PROCEDURE — 36415 COLL VENOUS BLD VENIPUNCTURE: CPT

## 2025-08-19 RX ORDER — ACETAMINOPHEN 325 MG/1
650 TABLET ORAL ONCE
Status: COMPLETED | OUTPATIENT
Start: 2025-08-19 | End: 2025-08-19

## 2025-08-19 RX ORDER — IOPAMIDOL 755 MG/ML
75 INJECTION, SOLUTION INTRAVASCULAR
Status: COMPLETED | OUTPATIENT
Start: 2025-08-19 | End: 2025-08-19

## 2025-08-19 RX ORDER — METOPROLOL SUCCINATE 25 MG/1
25 TABLET, EXTENDED RELEASE ORAL DAILY
COMMUNITY
Start: 2025-06-10

## 2025-08-19 RX ORDER — AMLODIPINE BESYLATE 10 MG/1
10 TABLET ORAL DAILY
COMMUNITY
Start: 2025-06-10

## 2025-08-19 RX ORDER — 0.9 % SODIUM CHLORIDE 0.9 %
1000 INTRAVENOUS SOLUTION INTRAVENOUS ONCE
Status: COMPLETED | OUTPATIENT
Start: 2025-08-19 | End: 2025-08-19

## 2025-08-19 RX ADMIN — IOPAMIDOL 75 ML: 755 INJECTION, SOLUTION INTRAVENOUS at 17:27

## 2025-08-19 RX ADMIN — SODIUM CHLORIDE 1000 ML: 0.9 INJECTION, SOLUTION INTRAVENOUS at 16:45

## 2025-08-19 RX ADMIN — ACETAMINOPHEN 650 MG: 325 TABLET ORAL at 16:45

## 2025-08-19 ASSESSMENT — PAIN - FUNCTIONAL ASSESSMENT
PAIN_FUNCTIONAL_ASSESSMENT: PREVENTS OR INTERFERES SOME ACTIVE ACTIVITIES AND ADLS
PAIN_FUNCTIONAL_ASSESSMENT: 0-10
PAIN_FUNCTIONAL_ASSESSMENT: 0-10

## 2025-08-19 ASSESSMENT — PAIN DESCRIPTION - LOCATION
LOCATION: ABDOMEN
LOCATION: ABDOMEN

## 2025-08-19 ASSESSMENT — PAIN SCALES - GENERAL
PAINLEVEL_OUTOF10: 5
PAINLEVEL_OUTOF10: 7

## 2025-08-19 ASSESSMENT — PAIN DESCRIPTION - DESCRIPTORS
DESCRIPTORS: ACHING
DESCRIPTORS: ACHING

## (undated) DEVICE — NERVE BLOCK TRAY: Brand: MEDLINE INDUSTRIES, INC.

## (undated) DEVICE — NEEDLE SPINAL 22GA L3.5IN SPINOCAN